# Patient Record
Sex: FEMALE | Race: WHITE | Employment: FULL TIME | URBAN - METROPOLITAN AREA
[De-identification: names, ages, dates, MRNs, and addresses within clinical notes are randomized per-mention and may not be internally consistent; named-entity substitution may affect disease eponyms.]

---

## 2017-03-23 NOTE — PROGRESS NOTES
Jacob Pagan is a 23year old female [facility] Patient's last menstrual period was 03/07/2017. Patient presents with:  Gyn Exam: ANNUAL EXAM / MEDS REFILL -- only wishes for ocps refilled. No need for valtrex.   .    OBSTETRICS HISTORY:  OB History   Gravid Triphasic (ORTHO TRI-CYCLEN, 28,) 0.18/0.215/0.25 MG-35 MCG Oral Tab, Take 1 tablet by mouth daily. , Disp: 3 Package, Rfl: 3  •  Albuterol Sulfate (VENTOLIN) (2.5 MG/3ML) 0.083% Inhalation Nebu Soln, Take  by nebulization every 6 (six) hours as needed for retraction or skin changes  Abdomen:  soft, nontender, nondistended, no masses  Skin/Hair: no unusual rashes or bruises  Extremities: no edema, no cyanosis  Psychiatric:  Oriented to time, place, person and situation.  Appropriate mood and affect    Pelvic

## 2017-04-06 NOTE — ED PROVIDER NOTES
Patient Seen in: [facility] AND [facility] Emergency Department    History   Patient presents with:  Abdomen/Flank Pain (GI/)      HPI    The patient presents complaining of right lower quadrant abdominal pain that started last night but is worsened today.   Sh daily        Review of Systems   Constitutional: Negative for fever and chills. HENT: Negative for congestion and sore throat. Eyes: Negative for pain and visual disturbance. Respiratory: Negative for cough and shortness of breath.     Cardiovascular Urine Moderate (*)     Leukocyte Esterase Urine Trace (*)     Bacteria Urine Few (*)     All other components within normal limits   BASIC METABOLIC PANEL (8) - Abnormal; Notable for the following:     Glucose 103 (*)     BUN 7 (*)     All other components diagnosis)    Disposition:  Discharge    Follow-up:  Karthik King MD  [address] [place] [id number] [phone]    Schedule an appointment as soon as possible for a visit in 2 days        Medications Prescribed:  Discharge Medicatio

## 2017-04-07 NOTE — TELEPHONE ENCOUNTER
PT WAS IN THE ER ON 4/6 WITH STRONG PAIN IN HER PELVIS, THEY DID A CT AND RULED OUT EVERYTHING .  SR TOLD PT SHE SHOULD CALL HER GYNE DR TO GET A ORDER FOR A ULTRA SOUND DUE TO OVARIAN  CYST , PT IS STILL HAVING PAIN , WOULD LIKE TO SPEAK TO NURSE ,

## 2017-04-07 NOTE — TELEPHONE ENCOUNTER
Pt stated she was in the ER yesterday for RLQ abdominal pain. Pt stated she had CT scan to r/o appendicitis and \"everything was normal\".  Pt stated that the ER doc told her to f/u with her doctor and recs to possibly have a pelvic US done to see if there

## 2017-04-07 NOTE — TELEPHONE ENCOUNTER
Since feels better, would not do anything. Call if pain recurs / worsens, then will order pelvic u/s.   CT scan states no pelvic masses thus nothing abn on ovaries

## 2017-07-31 NOTE — TELEPHONE ENCOUNTER
Pt thinks she has a UTI which she noticed symptoms more this morning, and she notices this after her period every few months.  req a c/b lv vm if no answer would like a RX # [phone]

## 2017-07-31 NOTE — TELEPHONE ENCOUNTER
C/O URINARY URGENCY, FREQUENCY AND MALODOROUS URINE. SAID SHE GETS UTI'S ONCE OR TWICE A YEAR. PT WILL COME FOR UA THIS AFTERNOON/EVENING AND ADVISED TO CALL IN THE MORNING FOR THE RESULT. ORDER PLACED.

## 2017-09-27 NOTE — TELEPHONE ENCOUNTER
The pt is returning a nurse's call, and states that the medication is for maintenance. Please advise.

## 2017-09-27 NOTE — TELEPHONE ENCOUNTER
Current Outpatient Prescriptions:                                      ValACYclovir HCl (VALTREX) 500 MG Oral Tab Take 1 tablet by mouth daily.  Disp: 30 tablet Rfl: 3

## 2017-09-27 NOTE — TELEPHONE ENCOUNTER
Pt states she has a canker sore. Pt likes to have Valtrex in case she needs to use it. Pt's Annual was 3/23/17 with ARLYN. Pt states she had informed NJG at her Annual she did not need Valtrex, but pt stating NJG said if she wants it to call.   Pt's pharmac

## 2017-09-28 NOTE — TELEPHONE ENCOUNTER
Canter sore dosing is very different from genital herpes outbreak which would be a daily dosing regimen.  If wishes for cold sore Rx, then needs 1 gram tabs -- take 2 tabs & repeat in 12 hours

## 2017-10-12 NOTE — TELEPHONE ENCOUNTER
SPOKE WITH PT AND SHE SAID HER SYMPTOMS DID RESOLVE BUT WANTS THE RX ANYWAY SO SHE HAS IT ON HAND WHEN SHE HAS ANOTHER OUTBREAK. RX SENT TO HER PHARMACY.

## 2018-03-04 NOTE — ED INITIAL ASSESSMENT (HPI)
PATIENT ARRIVED AMBULATORY TO ROOM C/O LOWER BACK PAIN STARTED 2 DAYS AGO. PATIENT DENIES ACUTE INJURY. DENIES NUMBNESS/TINGLING TO LEGS. NO INCONTINENCE. PT DENIES URINARY SYMPTOMS.  PATIENT STATES \"I WANT TO GET A URINE TEST BECAUSE I GET UTI'S A LOT\"

## 2018-03-04 NOTE — ED PROVIDER NOTES
Patient Seen in: [facility] AND [facility] Emergency Department    History   Patient presents with:  Back Pain (musculoskeletal)    Stated Complaint: back pain r/o kidney stone from IC    HPI    Effie Garrison is a 33year old female who presents with chief compla cetirizine (ZYRTEC) 10 MG Oral Tab,  Take 10 mg by mouth daily. Fluticasone Propionate (FLONASE) 50 MCG/ACT Nasal Suspension,  1 spray by Each Nare route daily. ValACYclovir HCl (VALTREX) 500 MG Oral Tab,  Take 1 tablet by mouth daily.        Family His There is no stridor. Air entry is equal.  Cardiovascular: Regular rate and rhythm, no murmurs, gallops, rubs. Capillary refill is brisk. No extremity edema. Gastrointestinal: Abdomen soft, nontender, nondistended.   There is no rebound tenderness or gua DIFFERENTIAL       MDM     Radiology findings: Ct Abdomen+pelvis Kidneystone 2d Rndr(no Iv,no Oral)(cpt=74176)    Result Date: 3/4/2018  CONCLUSION:  1. 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 Stable appearance since prior study.  2. Negative gallbladder, spleen

## 2018-03-04 NOTE — ED INITIAL ASSESSMENT (HPI)
Pt c/o lower back x1 week, states started on left side. Seen at [address] [place] [address] and sent for r/o kidney stone.

## 2018-03-04 NOTE — ED PROVIDER NOTES
Patient Seen in: [facility]    History   Patient presents with:  Back Pain (musculoskeletal)    Stated Complaint: lower back pain    HPI  Patient reports 3 days of severe back pain and unable to get comfortable.   Patient well-developed and well-nourished. No distress. Patient is sobbing and tremulous, moves slowly but stably on and off cart   HENT:   Head: Normocephalic and atraumatic.    Right Ear: External ear normal.   Left Ear: External ear normal.   Eyes: Conjunctiva

## 2018-03-12 RX ORDER — NORGESTIMATE-ETHINYL ESTRADIOL 7DAYSX3 28
1 TABLET ORAL DAILY
Qty: 84 TABLET | Refills: 0 | OUTPATIENT
Start: 2018-03-12 | End: 2018-04-09

## 2018-03-12 NOTE — TELEPHONE ENCOUNTER
Pt informed of KCBs recs and verbalized understanding. Pt asking what Lucho Maya would recommend for her kidney pain.   Pt stated she was given pain meds and muscle relaxant meds in the ER last weekend when she went in for kidney pain, but stopped taking these las

## 2018-03-12 NOTE — TELEPHONE ENCOUNTER
Pt states she went to ED on 3/4/18 and was informed that she has an UTI. Pt took Cephalexin 500mg, three times a day, for 7 days. Pt finished her medication yesterday. Pt states that she was informed it was not kidney stones.   (Pt has an ED f/u on 3/13/

## 2018-03-12 NOTE — TELEPHONE ENCOUNTER
----- Message from Caleb Charlton MD sent at 3/12/2018  4:21 PM CDT -----  UA overall negative. Will not treat at this time.

## 2018-03-12 NOTE — TELEPHONE ENCOUNTER
Discussed with KCB and pt should do an UA with reflex to culture and keep her post ED appt with ARLYN, on 3/13/18. Pt will go for UA in near future and call later for the results.

## 2018-03-13 NOTE — PROGRESS NOTES
Jessee Delgado is a 33year old female [facility] Patient's last menstrual period was 03/08/2018. Patient presents with:  Gyn Problem: ER F/UP -- Feb 22nd sudden onset back L flank pain -- TENS helped.  Then worsened weekend of March 4th -- dx w/ bad UTI -- no •  ValACYclovir HCl (VALTREX) 500 MG Oral Tab, Take 1 tablet by mouth daily. , Disp: 30 tablet, Rfl: 3  •  Ciclopirox 1 % External Shampoo, ADOLFO TO SCALP AND FACE QOD, Disp: , Rfl: 0    ALLERGIES:    Sulfanilamide           Unknown      Review of Systems:

## 2018-06-19 ENCOUNTER — TELEPHONE (OUTPATIENT)
Dept: OBGYN CLINIC | Facility: CLINIC | Age: 28
End: 2018-06-19

## 2018-06-19 DIAGNOSIS — R35.0 FREQUENCY OF URINATION: Primary | ICD-10-CM

## 2018-06-20 NOTE — TELEPHONE ENCOUNTER
Pt complains of frequency, a \"little bit of stinging\" with urination and lower back pain for a couple days. Denies abdominal pain. Pt reports she is concerned this is the start of another kidney infection because she had one recently. Advised pt to push hydration. Informed pt she can go to the lab for a UA and to call us after for results.

## 2018-07-12 NOTE — TELEPHONE ENCOUNTER
Pt's last annual was in 3/2017. Pt's last pap was normal in 2016. No future appt's made for annual.  Refill for nortrel 1/35 declined. Pt needs appt.

## 2018-09-18 NOTE — PROGRESS NOTES
Vitor Maynard is a 33year old female [facility] Patient's last menstrual period was 09/13/2018. Patient presents with:  Gyn Exam: annual  Contraception: gets monthly hand pain prior to period -- occurs in mother also. Liked OTcN best but name brand only.  Wi Sexual activity: Yes        Partners: Male        Birth control/protection: OCP    Other Topics      Concerns:         Service: Not Asked        Blood Transfusions: Not Asked        Caffeine Concern: Yes          coffee, soda, 1 cup daily        Oc denies depression or anxiety. Endocrine:     denies excessive thirst or urination. Heme/Lymph:    denies history of anemia, easy bruising or bleeding.       PHYSICAL EXAM:   /71   Pulse 72   Wt 160 lb (72.6 kg)   LMP 09/13/2018   BMI 28.34 kg/m²

## 2018-09-19 NOTE — TELEPHONE ENCOUNTER
Received notification from pt's pharmacy stating PA is required for Ortho Tri Cyclen. Called [employer] at [phone] to initiate PA. Spoke to Diana who states PA is not required. Notified pt's pharmacy.  Pharmacist states insurance does not cover

## 2019-05-14 ENCOUNTER — TELEPHONE (OUTPATIENT)
Dept: OBGYN CLINIC | Facility: CLINIC | Age: 29
End: 2019-05-14

## 2019-05-14 DIAGNOSIS — R35.0 URINE FREQUENCY: Primary | ICD-10-CM

## 2019-05-14 NOTE — TELEPHONE ENCOUNTER
Message to [facility]. Please review UA. Culture still in process. Pt called with urgency, frequency, burning with urination and urine odor.

## 2019-05-14 NOTE — TELEPHONE ENCOUNTER
Pt thinks that she has an uti. Pt states that she has the symptoms of urgency, frequency, burning with urination and  urine odor. Denies back pain, fever, cloudy urine and having her period. Informed pt to go for a UA with reflex and call later day for the results. Pt stated understanding.

## 2019-10-24 NOTE — PROGRESS NOTES
Kym Le is a 30year old female [facility] Patient's last menstrual period was 10/09/2019. Patient presents with:  Gyn Exam: ANNUAL EXAM / BCP REFILL  Last annual exam was 2017. Last pap was 2016 and normal. Denies hx of abnormal pap's.  Will do pap tod Not on file        Minutes per session: Not on file      Stress: Not on file    Relationships      Social connections:        Talks on phone: Not on file        Gets together: Not on file        Attends Yazidi service: Not on file        Active member o fatigue, night sweats, hot flashes  Eyes:  denies blurred or double vision  Cardiovascular:  denies chest pain or palpitations  Respiratory:  denies shortness of breath  Gastrointestinal:  denies heartburn, abdominal pain, diarrhea or constipation  Genitou orders for this visit:    Well woman exam with routine gynecological exam    Medication refill  -     Norgestim-Eth Estrad Triphasic (TRI-SPRINTEC) 0.18/0.215/0.25 MG-35 MCG Oral Tab; Take 1 tablet by mouth once daily.     Cervical cancer screening  -     T

## 2019-10-30 NOTE — TELEPHONE ENCOUNTER
PT NOTIFIED OF RESULTS AND RECS. SHE IS AWARE COLPO IS DONE WITH A DR AND SHE HAS SEEN NJG SO COLPO BOOKED ON 11-13-19 WITH ARLYN.

## 2019-11-14 NOTE — PROCEDURES
Colpo w/Cx Biopsy and ECC      Birth control method(s) used: ocp    Consent signed. Procedure discussed with patient in detail including indication, risk, benefits, alternatives and complications.     Indication: ASCUS HPV (+)    Procedure:  Under satisf

## 2019-11-14 NOTE — PROGRESS NOTES
My Chart message sent to patient re: colpo ALBERTO 1. Please follow -up & make sure pt saw their letter within next week. Enter patient info into Pap log.

## 2021-01-04 NOTE — TELEPHONE ENCOUNTER
Patient has made appt on Wednesday (1/6) with Navarro Mcdaniel for Physical.    She is asking to get refill ASAP. She took her last pill yesterday. Please advise.

## 2021-01-07 NOTE — PROGRESS NOTES
Vitor Maynard is a 35year old female [facility] Patient's last menstrual period was 01/29/2020 (approximate). Patient presents with:  Gyn Exam: ANNUAL EXAM   Last annual exam and pap was last year. Pap was ASCUS, + HPV. Colpo showed ALBERTO 1.  Will do co-testin per session: Not on file      Stress: Not on file    Relationships      Social connections        Talks on phone: Not on file        Gets together: Not on file        Attends Christian service: Not on file        Active member of club or organization: Not flashes  Eyes:  denies blurred or double vision  Cardiovascular:  denies chest pain or palpitations  Respiratory:  denies shortness of breath  Gastrointestinal:  denies heartburn, abdominal pain, diarrhea or constipation  Genitourinary:  denies dysuria, in woman exam with routine gynecological exam    Medication refill  -     Norgestim-Eth Estrad Triphasic (TRI-SPRINTEC) 0.18/0.215/0.25 MG-35 MCG Oral Tab; Take 1 tablet by mouth once daily.     Screening for malignant neoplasm of cervix  -     THINPREP PAP SM

## 2021-01-13 ENCOUNTER — TELEPHONE (OUTPATIENT)
Dept: OBGYN CLINIC | Facility: CLINIC | Age: 31
End: 2021-01-13

## 2021-01-13 NOTE — TELEPHONE ENCOUNTER
Pt informed of MAFs recs below and verbalized understanding. Pt stated that she was very uncomfortable with this procedure last time and doesn't know if she could go through with this again. Pt asking if there is an anxiety medication that she could take before colpo? Message to [facility] to please advise. Pt stated she is heading out of town today and may not answer when we call her back.

## 2021-01-13 NOTE — TELEPHONE ENCOUNTER
----- Message from DARIO Savage sent at 1/11/2021  5:53 AM CST -----  Please let pt know pap shows same as last year (ASCUS, + HPV) so she needs another colpo. Please let me know if she has any questions.      MAF

## 2021-01-13 NOTE — TELEPHONE ENCOUNTER
Minh send her a dose of xanax but she can not drive herself to and from the appt, as we have no idea how it will affect her. This med can sometimes make people feel sleep/groggy. If she agrees to this, Minh send the dose to her pharmacy and she should take it about 30 min before her appt.      MAF

## 2021-01-22 ENCOUNTER — TELEPHONE (OUTPATIENT)
Dept: OBGYN CLINIC | Facility: CLINIC | Age: 31
End: 2021-01-22

## 2021-01-22 NOTE — TELEPHONE ENCOUNTER
Patient calling stating she had an emergency and had to leave abruptly. Forgot BC and is to begin again Sunday.  Patient would like if one month supply can be sent to pharmacy in [facility] [address] [facility]  [phone]

## 2021-01-22 NOTE — TELEPHONE ENCOUNTER
Called pharmacy, [place], at [phone]. Placed with pharmacist Daisy Park, for one pack for Tri-Sprintec with no refills. Sig:  Take one tablet po daily. Sent to Ryan Falcon MD on call, that one pack sent to the pharmacy.

## 2022-05-10 NOTE — PROGRESS NOTES
Pt 7w3d by LMP seen for OBN appt today. She reports regular, monthly cycles. She complains of nausea without vomiting. Reviewed small frequent meals high in protein, B6 25 mg up to 4 x daily, unisom 1/2 tab at bedtime. Normal PN labs ordered, plus 1 hr gtt d/t BMI, HCV, Varicella antibodies. Pt advised all labs must be completed and resulted prior to MD appt. Pt scheduled with MD 5/23/22 with ARPITA. Patient has anxiety related to blood draws. Partner's name is Thad Dotson contact #[phone]; race:   Occupation: [employer]    MEDICAL HISTORY    Anemia No    Anesthetic complications No    Anxiety/Depression  Yes Self reported anxiety, desired [facility] Navigator referral.   Autoimmune Disorder No    Asthma  Yes Seasonal asthma, triggered by allergies   Cancer No    Diabetes  No    Gyne/breast Surgery No    Heart Disease No    Hepatitis/Liver Disease  No    History of blood transfusion No    History of abnormal pap Yes ASCUS +HPV 2019, 2021. Colpo in 2019. Hypertension  No    Infertility  No    Kidney Disease/Frequent UTIs  Yes Kidney infection 2017   Medication Allergies Yes Topical Sulfa- face rash   Latex Allergies No    Food Allergies  No    Neurological Disorder/Epilepsy No    Operations/Hospitalizations Yes Tonsillectomy 2010   TB exposure  No    Thyroid Dysfunction No    Trauma/Violence  No    Uterine Anomaly  No    Uterine Fibroids  No    Variocosities/DVTs No    Smoker No    Drug usage in prior year No    Alcohol No    Would you accept a blood transfusion? Yes              INFECTION HISTORY  Chlamydia No    Pt or partner have hx of Genital Herpes Yes Last outbreak was 5 years ag0. Does not take anything.    Gonorrhea No    Hepatitis B No    HIV No    HPV No    MRSA Yes On face 2015   Syphilis No    Tattoos No    Live with someone or Exposed to TB No    Rash or viral illness since LMP  No    Varicella No Varicella igg ordered   Recent Travel (or planned travel) to [facility] [place] for self and or partner Yes Travelled to [place] in last 6 months   Pets Yes 2 dogs       [address]  Screening/Teratology Counseling- Includes patient, baby's father, or anyone in either family with:  Patient's age 28 years or older as of estimated date of delivery: No   Thalassemia ([facility] [place], [address], or  background): MCV less than 80: No   Neural tube defect (Meningomyelocele, Spina bifida, or Anencephaly): No   Congenital heart defect: No   Down syndrome: No   Calderon-Sachs (Ashkenazi Gnosticist, [place], [place]): No   Canavan disease (Ashkenazi Gnosticist): No   Familial dysautonomia (Ashkenazi Gnosticist): No   Sickle cell disease or trait (): No   Hemophilia or other blood disorders: No   Muscular dystrophy: No    Cystic fibrosis: No   Boulder's chorea: No   Intellectual disability and/or autism: Yes (Comment: paternal neice on austism spectrum)   If yes, was the person tested for Fragile X?: No   Other inherited genetic or chromosomal disorder: Yes (Comment: Pt brother with seizure disorder passed to his daughter- infantile seizures)   Maternal metabolic disorder (eg. Type 1 diabetes, PKU): No   Patient or baby's father had child with birth defects not listed above: No   Recurrent pregnancy loss, or a stillbirth: No   Medications (including supplements, vitamins, herbs, or OTC drugs)/illicit/recreational drugs/alcohol since last menstrual period: Yes   If yes, agent(s) and strength/dosage: See med list           MISC    Infant vaccinations  Yes     Pt. Has answered NO 5P questions and has NO  risk factors. Pt. Given What pregnant women need to know handout.

## 2022-05-23 NOTE — PROGRESS NOTES
Daily nausea without emesis. Early US = dates. 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 Racheal Chiu at visit-  at [place] on [address]. We discussed FTS and they'll message within week if desired.

## 2022-06-20 PROBLEM — O99.210 OBESITY IN PREGNANCY: Status: ACTIVE | Noted: 2022-06-20

## 2022-06-20 PROBLEM — D21.9 FIBROID: Status: ACTIVE | Noted: 2022-06-20

## 2022-06-20 PROBLEM — O99.210 OBESITY IN PREGNANCY (HCC): Status: ACTIVE | Noted: 2022-06-20

## 2022-06-20 NOTE — PROGRESS NOTES
She reports nauseated daily at noon til night time. Will try pepcid - feels like something gets stuck in throat and acid taste in mouth.   Had first tri screen - US normal. Right lateral pedunculated fibroid seen  Will send referral for level 2  rto 4 weeks    DARIO Greco

## 2022-06-21 NOTE — TELEPHONE ENCOUNTER
[facility] screening results obt  Reviewed by   Negative results for Trisomy 18/13/21  Fetal fraction 11%  Fetal sex: female    Pt  Reminded on Use of [facility] registration card   Given at  Previous appointment   Pt can log in  and view results    Pt states understanding  Copy of results sent for scanning into pt record

## 2022-08-08 NOTE — PROGRESS NOTES
MARISA Walters is a 31 year old       who presents for a post partum visit.  She is s/p vaginal delivery.    Postpartum  Review: (entered by: Eryn Graves,Medical Assistant)  Period since delivery? YES  Edie since delivery? YES  She currently is bottle feeding exclusively.    Are there concerns about your baby that you want us to know about? No  Okay to leave a message with results?  Yes  Feeding : bottle  Are you planning on getting pregnant again? No  Per the patient,  no laceration or episiotomy.   Any difficulty with bowel movements? NO  Any difficulty with urination (frequency, pressure, burning)?  NO  Other physical complaints:  None   Gestational diabetes last pregnancy?: No  Depression screening:  Over the last 2 weeks, how often have you been bothered by the following problems?          PHQ2 Score: 0  PHQ2 Score Interpretation: No further screening needed  1. Little interest or pleasure in activity?: 0  2. Feeling down, depressed, or hopeless?: 0       Generalized Anxiety Disorder (GAD7)    Over the last 2 weeks, how often have you been bothered by the following problems?   Score: 0    Score:  0-4 minimal symptoms 10-14 moderate symptoms   5-9 mild symptoms 15-21 severe symptoms      1.  Feeling nervous, anxious or on edge Not at all   2.  Not being able to stop or control worrying Not at all   3.  Worrying too much about different things Not at all   4.  Trouble relaxing Not at all   5.  Being so restless that it's hard to sit still Not at all   6.  Becoming easily annoyed or irritable Not at all   7.  Feeling afraid as if something awful might happen Not at all            If you checked off any problems, how difficult have these made it for you to do your work, take care of things at home, or get along with other people? Not difficult at all              OBJECTIVE    Physical Exam:  Visit Vitals  LMP 2020     General: Well developed, well nourished, in no acute  Pt for level 2 US  HX neg NIPT  Denies pregnancy complaints  States active fetus distress.  Head: Normocephalic and atraumatic.   Abdomen: Abdomen soft and nontender without masses.  Psych: Alert and cooperative; normal mood and affect; normal attention span and concentration.     Impression/Plan:      Normal postpartum exam after vaginal delivery     I have discussed postpartum contraception with the patient including progestin-only oral contraceptive. She elects: progestin-only oral contraceptive.  We discussed further childbearing plans. Discussed returning to all normal activity includiing intercourse and exercise.

## 2022-09-12 ENCOUNTER — ROUTINE PRENATAL (OUTPATIENT)
Dept: OBGYN CLINIC | Facility: CLINIC | Age: 32
End: 2022-09-12
Payer: COMMERCIAL

## 2022-09-12 ENCOUNTER — LAB ENCOUNTER (OUTPATIENT)
Dept: LAB | Facility: HOSPITAL | Age: 32
End: 2022-09-12
Attending: OBSTETRICS & GYNECOLOGY
Payer: COMMERCIAL

## 2022-09-12 VITALS
WEIGHT: 194 LBS | HEART RATE: 90 BPM | BODY MASS INDEX: 34 KG/M2 | SYSTOLIC BLOOD PRESSURE: 108 MMHG | DIASTOLIC BLOOD PRESSURE: 72 MMHG

## 2022-09-12 DIAGNOSIS — Z34.02 ENCOUNTER FOR SUPERVISION OF NORMAL FIRST PREGNANCY IN SECOND TRIMESTER: ICD-10-CM

## 2022-09-12 DIAGNOSIS — Z34.02 ENCOUNTER FOR SUPERVISION OF NORMAL FIRST PREGNANCY IN SECOND TRIMESTER: Primary | ICD-10-CM

## 2022-09-12 LAB
APPEARANCE: CLEAR
BILIRUBIN: NEGATIVE
DEPRECATED RDW RBC AUTO: 46.4 FL (ref 35.1–46.3)
ERYTHROCYTE [DISTWIDTH] IN BLOOD BY AUTOMATED COUNT: 13.3 % (ref 11–15)
GLUCOSE (URINE DIPSTICK): NEGATIVE MG/DL
GLUCOSE 1H P GLC SERPL-MCNC: 145 MG/DL
HCT VFR BLD AUTO: 37.5 %
HGB BLD-MCNC: 12.3 G/DL
KETONES (URINE DIPSTICK): NEGATIVE MG/DL
LEUKOCYTES: NEGATIVE
MCH RBC QN AUTO: 31.1 PG (ref 26–34)
MCHC RBC AUTO-ENTMCNC: 32.8 G/DL (ref 31–37)
MCV RBC AUTO: 94.7 FL
MULTISTIX LOT#: NORMAL NUMERIC
NITRITE, URINE: NEGATIVE
OCCULT BLOOD: NEGATIVE
PH, URINE: 5 (ref 4.5–8)
PLATELET # BLD AUTO: 174 10(3)UL (ref 150–450)
PROTEIN (URINE DIPSTICK): NEGATIVE MG/DL
RBC # BLD AUTO: 3.96 X10(6)UL
SPECIFIC GRAVITY: 1 (ref 1–1.03)
URINE-COLOR: YELLOW
UROBILINOGEN,SEMI-QN: 0.2 MG/DL (ref 0–1.9)
WBC # BLD AUTO: 11.6 X10(3) UL (ref 4–11)

## 2022-09-12 PROCEDURE — 85027 COMPLETE CBC AUTOMATED: CPT

## 2022-09-12 PROCEDURE — 82950 GLUCOSE TEST: CPT

## 2022-09-12 PROCEDURE — 36415 COLL VENOUS BLD VENIPUNCTURE: CPT

## 2022-09-13 ENCOUNTER — PATIENT MESSAGE (OUTPATIENT)
Dept: OBGYN CLINIC | Facility: CLINIC | Age: 32
End: 2022-09-13

## 2022-09-13 DIAGNOSIS — O99.810 PREGNANCY WITH ABNORMAL GLUCOSE TOLERANCE TEST (GTT): Primary | ICD-10-CM

## 2022-09-13 NOTE — TELEPHONE ENCOUNTER
Pt mycharting about 1 hr gtt of 145, pt informed of need for 3 hr gtt. Fasting and scheduling instructions given. Order placed.

## 2022-09-13 NOTE — TELEPHONE ENCOUNTER
From: Sandro Brochure  To: Tiffany Santos MD  Sent: 9/13/2022 9:43 AM CDT  Subject: GD Failed Test    Hi Dr Santos,    We met yesterday at my 25 week appointment. I went and took my gestational diabetes test after my appointment and got a result of 145. Jean Pierre Player been very anxious about that since seeing my results. Can someone give me a call or send me a message today about next steps?  I assume a 3hr test.     Thanks,  Keven Koch

## 2022-09-19 ENCOUNTER — LABORATORY ENCOUNTER (OUTPATIENT)
Dept: LAB | Facility: HOSPITAL | Age: 32
End: 2022-09-19
Attending: OBSTETRICS & GYNECOLOGY

## 2022-09-19 DIAGNOSIS — O99.810 PREGNANCY WITH ABNORMAL GLUCOSE TOLERANCE TEST (GTT): ICD-10-CM

## 2022-09-19 LAB
GLUCOSE 1H P GLC SERPL-MCNC: 130 MG/DL
GLUCOSE 2H P GLC SERPL-MCNC: 126 MG/DL
GLUCOSE 3H P GLC SERPL-MCNC: 96 MG/DL (ref 70–140)
GLUCOSE P FAST SERPL-MCNC: 78 MG/DL

## 2022-09-19 PROCEDURE — 82951 GLUCOSE TOLERANCE TEST (GTT): CPT

## 2022-09-19 PROCEDURE — 36415 COLL VENOUS BLD VENIPUNCTURE: CPT

## 2022-09-19 PROCEDURE — 82952 GTT-ADDED SAMPLES: CPT

## 2022-10-03 ENCOUNTER — ROUTINE PRENATAL (OUTPATIENT)
Dept: OBGYN CLINIC | Facility: CLINIC | Age: 32
End: 2022-10-03
Payer: COMMERCIAL

## 2022-10-03 VITALS
BODY MASS INDEX: 35 KG/M2 | SYSTOLIC BLOOD PRESSURE: 111 MMHG | WEIGHT: 200 LBS | HEART RATE: 92 BPM | DIASTOLIC BLOOD PRESSURE: 74 MMHG

## 2022-10-03 DIAGNOSIS — Z34.02 ENCOUNTER FOR SUPERVISION OF NORMAL FIRST PREGNANCY IN SECOND TRIMESTER: Primary | ICD-10-CM

## 2022-10-03 LAB
APPEARANCE: CLEAR
BILIRUBIN: NEGATIVE
GLUCOSE (URINE DIPSTICK): NEGATIVE MG/DL
KETONES (URINE DIPSTICK): NEGATIVE MG/DL
LEUKOCYTES: NEGATIVE
MULTISTIX LOT#: ABNORMAL NUMERIC
NITRITE, URINE: NEGATIVE
OCCULT BLOOD: NEGATIVE
PH, URINE: 5 (ref 4.5–8)
PROTEIN (URINE DIPSTICK): NEGATIVE MG/DL
SPECIFIC GRAVITY: 1 (ref 1–1.03)
URINE-COLOR: YELLOW
UROBILINOGEN,SEMI-QN: 0.2 MG/DL (ref 0–1.9)

## 2022-10-03 PROCEDURE — 81002 URINALYSIS NONAUTO W/O SCOPE: CPT | Performed by: OBSTETRICS & GYNECOLOGY

## 2022-10-03 PROCEDURE — 3074F SYST BP LT 130 MM HG: CPT | Performed by: OBSTETRICS & GYNECOLOGY

## 2022-10-03 PROCEDURE — 3078F DIAST BP <80 MM HG: CPT | Performed by: OBSTETRICS & GYNECOLOGY

## 2022-10-17 ENCOUNTER — ROUTINE PRENATAL (OUTPATIENT)
Dept: OBGYN CLINIC | Facility: CLINIC | Age: 32
End: 2022-10-17
Payer: COMMERCIAL

## 2022-10-17 VITALS
HEART RATE: 99 BPM | BODY MASS INDEX: 35 KG/M2 | SYSTOLIC BLOOD PRESSURE: 108 MMHG | DIASTOLIC BLOOD PRESSURE: 72 MMHG | WEIGHT: 199 LBS

## 2022-10-17 DIAGNOSIS — Z34.91 ENCOUNTER FOR SUPERVISION OF NORMAL PREGNANCY IN FIRST TRIMESTER, UNSPECIFIED GRAVIDITY: Primary | ICD-10-CM

## 2022-10-17 PROBLEM — F40.231 SEVERE NEEDLE PHOBIA: Status: ACTIVE | Noted: 2022-10-17

## 2022-10-17 LAB
APPEARANCE: CLEAR
BILIRUBIN: NEGATIVE
GLUCOSE (URINE DIPSTICK): NEGATIVE MG/DL
KETONES (URINE DIPSTICK): NEGATIVE MG/DL
LEUKOCYTES: NEGATIVE
MULTISTIX LOT#: NORMAL NUMERIC
NITRITE, URINE: NEGATIVE
OCCULT BLOOD: NEGATIVE
PH, URINE: 7 (ref 4.5–8)
PROTEIN (URINE DIPSTICK): NEGATIVE MG/DL
SPECIFIC GRAVITY: 1 (ref 1–1.03)
URINE-COLOR: YELLOW
UROBILINOGEN,SEMI-QN: 0.2 MG/DL (ref 0–1.9)

## 2022-10-17 PROCEDURE — 3074F SYST BP LT 130 MM HG: CPT | Performed by: OBSTETRICS & GYNECOLOGY

## 2022-10-17 PROCEDURE — 3078F DIAST BP <80 MM HG: CPT | Performed by: OBSTETRICS & GYNECOLOGY

## 2022-10-17 PROCEDURE — 81002 URINALYSIS NONAUTO W/O SCOPE: CPT | Performed by: OBSTETRICS & GYNECOLOGY

## 2022-10-17 NOTE — PROGRESS NOTES
No S/S of PTL. Discussed TDAP and she'll have to get spouse with her another time for a needle stick.

## 2022-10-27 ENCOUNTER — PATIENT MESSAGE (OUTPATIENT)
Dept: OBGYN CLINIC | Facility: CLINIC | Age: 32
End: 2022-10-27

## 2022-10-27 DIAGNOSIS — R09.81 CONGESTION OF NASAL SINUS: Primary | ICD-10-CM

## 2022-10-28 ENCOUNTER — LAB ENCOUNTER (OUTPATIENT)
Dept: LAB | Age: 32
End: 2022-10-28
Attending: OBSTETRICS & GYNECOLOGY
Payer: COMMERCIAL

## 2022-10-28 ENCOUNTER — TELEPHONE (OUTPATIENT)
Dept: OBGYN CLINIC | Facility: CLINIC | Age: 32
End: 2022-10-28

## 2022-10-28 DIAGNOSIS — R09.81 CONGESTION OF NASAL SINUS: ICD-10-CM

## 2022-10-28 NOTE — TELEPHONE ENCOUNTER
Can do video visit at that time-- she is only 31 wks. Needs to quarantine. We cannot see her in office physically for 10 days.

## 2022-10-28 NOTE — TELEPHONE ENCOUNTER
Pt did covid test, results not in until Mon.  Pt wants to know about Mon apt with WYATT (see [employer] message yesterday)

## 2022-10-28 NOTE — TELEPHONE ENCOUNTER
Video visit conversion same day at 940 is based on WYATT preference. I stated at that time meaning at 640 pm on my schedule. She does not have BP issues thus okay not to have done on Monday.

## 2022-10-28 NOTE — TELEPHONE ENCOUNTER
Message back to [facility]. Pt asking how she is supposed to get her BP checked if appt is video call.

## 2022-10-28 NOTE — TELEPHONE ENCOUNTER
31w6d. Pt mycharted yesterday that she was exposed to co-workers who were COVID positive. Pt states she took at home COVID test last night and had a faint positive result. Pt did complete a PCR and it is currently in process. Pt states runny/stuffy nose and fatigue that she indicates she has had entire pregnancy. Pt has appt with WYATT on Monday in the morning, discussed appt possibly being moved to end of day with another provider or being seen outside of 10 day quarantine date. Did discuss at home comfort care and OTC medications she can take that are safe with pregnancy. Pt states +FM, denies LOF, VB or ctxs. BMI does not meet anticoagulation therapy requirements. BMI: 35.4  MFM appt on 11/7 for growth ultrasound. [facility] does have office appt on 10/31 at 640 pm, on hold for this pt until we hear back from provider     To [address] on-call to please review if we should await PCR for confirmation or use faint positive home test? Should we schedule pt at the end of your office schedule on 10/31 at 640 pm? Thank you.

## 2022-10-29 ENCOUNTER — PATIENT MESSAGE (OUTPATIENT)
Dept: OBGYN CLINIC | Facility: CLINIC | Age: 32
End: 2022-10-29

## 2022-10-29 PROBLEM — U07.1 COVID-19 AFFECTING PREGNANCY IN THIRD TRIMESTER (HCC): Status: ACTIVE | Noted: 2022-10-29

## 2022-10-29 PROBLEM — O98.513 COVID-19 AFFECTING PREGNANCY IN THIRD TRIMESTER: Status: ACTIVE | Noted: 2022-10-29

## 2022-10-29 PROBLEM — O98.513 COVID-19 AFFECTING PREGNANCY IN THIRD TRIMESTER (HCC): Status: ACTIVE | Noted: 2022-10-29

## 2022-10-29 PROBLEM — U07.1 COVID-19 AFFECTING PREGNANCY IN THIRD TRIMESTER: Status: ACTIVE | Noted: 2022-10-29

## 2022-10-29 LAB — SARS-COV-2 RNA RESP QL NAA+PROBE: NOT DETECTED

## 2022-10-29 NOTE — TELEPHONE ENCOUNTER
Pt called and informed of [facility] recs that since BP is stable at previous visit that would move forward with VV with WYATT on 10/31. Discussed with pt that non-physical assessment will be done, such as discussing FM, if any LOF, ctxs or VB. Also can discuss any questions regarding COVID in pregnancy. Pt informed if there is any concerns where MD feels she needs further monitoring can be done on the [facility]. Pt states understanding, will keep VV appt with WYATT on Monday. Pt states +FM, denies any LOF, VB or ctxs. We reviewed kick counts, PTL instructions as well as pre-eclampsia precautions. COVID PCR still processing.

## 2022-10-31 ENCOUNTER — ROUTINE PRENATAL (OUTPATIENT)
Dept: OBGYN CLINIC | Facility: CLINIC | Age: 32
End: 2022-10-31
Payer: COMMERCIAL

## 2022-10-31 ENCOUNTER — PATIENT MESSAGE (OUTPATIENT)
Dept: OBGYN CLINIC | Facility: CLINIC | Age: 32
End: 2022-10-31

## 2022-10-31 VITALS
BODY MASS INDEX: 35 KG/M2 | HEART RATE: 91 BPM | SYSTOLIC BLOOD PRESSURE: 103 MMHG | WEIGHT: 200 LBS | DIASTOLIC BLOOD PRESSURE: 71 MMHG

## 2022-10-31 DIAGNOSIS — Z34.03 ENCOUNTER FOR SUPERVISION OF NORMAL FIRST PREGNANCY IN THIRD TRIMESTER: Primary | ICD-10-CM

## 2022-10-31 LAB
APPEARANCE: CLEAR
GLUCOSE (URINE DIPSTICK): NEGATIVE MG/DL
KETONES (URINE DIPSTICK): 15 MG/DL
MULTISTIX LOT#: ABNORMAL NUMERIC
NITRITE, URINE: NEGATIVE
PROTEIN (URINE DIPSTICK): NEGATIVE MG/DL
URINE-COLOR: YELLOW

## 2022-10-31 PROCEDURE — 3078F DIAST BP <80 MM HG: CPT | Performed by: OBSTETRICS & GYNECOLOGY

## 2022-10-31 PROCEDURE — 81002 URINALYSIS NONAUTO W/O SCOPE: CPT | Performed by: OBSTETRICS & GYNECOLOGY

## 2022-10-31 PROCEDURE — 3074F SYST BP LT 130 MM HG: CPT | Performed by: OBSTETRICS & GYNECOLOGY

## 2022-10-31 NOTE — PROGRESS NOTES
COVID PCR negative but had home test (+). (+) exposure at work. Has MFM appt next week.  To get TDAP in 2 wks

## 2022-11-04 ENCOUNTER — HOSPITAL ENCOUNTER (OUTPATIENT)
Facility: HOSPITAL | Age: 32
Discharge: HOME | End: 2022-11-04
Attending: OBSTETRICS & GYNECOLOGY | Admitting: OBSTETRICS & GYNECOLOGY
Payer: COMMERCIAL

## 2022-11-04 ENCOUNTER — APPOINTMENT (OUTPATIENT)
Dept: ULTRASOUND IMAGING | Facility: HOSPITAL | Age: 32
End: 2022-11-04
Attending: OBSTETRICS & GYNECOLOGY
Payer: COMMERCIAL

## 2022-11-04 ENCOUNTER — TELEPHONE (OUTPATIENT)
Dept: OBGYN CLINIC | Facility: CLINIC | Age: 32
End: 2022-11-04

## 2022-11-04 VITALS — SYSTOLIC BLOOD PRESSURE: 130 MMHG | RESPIRATION RATE: 18 BRPM | DIASTOLIC BLOOD PRESSURE: 56 MMHG | HEART RATE: 80 BPM

## 2022-11-04 PROCEDURE — 76815 OB US LIMITED FETUS(S): CPT | Performed by: OBSTETRICS & GYNECOLOGY

## 2022-11-04 PROCEDURE — 59025 FETAL NON-STRESS TEST: CPT | Performed by: OBSTETRICS & GYNECOLOGY

## 2022-11-04 RX ORDER — ACETAMINOPHEN 500 MG
TABLET ORAL
Status: DISCONTINUED
Start: 2022-11-04 | End: 2022-11-05

## 2022-11-04 RX ORDER — ACETAMINOPHEN 500 MG
1000 TABLET ORAL ONCE
Status: COMPLETED | OUTPATIENT
Start: 2022-11-04 | End: 2022-11-04

## 2022-11-04 NOTE — TELEPHONE ENCOUNTER
ОЛЬГА from [address] [place] [address] for [facility]. Pt should go in now. Patient notified of recs and directions given to [facility]. Patient is appreciative.
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
## 2022-11-07 ENCOUNTER — HOSPITAL ENCOUNTER (OUTPATIENT)
Dept: PERINATAL CARE | Facility: HOSPITAL | Age: 32
Discharge: HOME | End: 2022-11-07
Attending: OBSTETRICS & GYNECOLOGY
Payer: COMMERCIAL

## 2022-11-07 ENCOUNTER — HOSPITAL ENCOUNTER (OUTPATIENT)
Dept: PERINATAL CARE | Facility: HOSPITAL | Age: 32
End: 2022-11-07
Attending: OBSTETRICS & GYNECOLOGY
Payer: COMMERCIAL

## 2022-11-07 VITALS
WEIGHT: 200 LBS | DIASTOLIC BLOOD PRESSURE: 66 MMHG | BODY MASS INDEX: 35 KG/M2 | HEART RATE: 113 BPM | SYSTOLIC BLOOD PRESSURE: 116 MMHG

## 2022-11-07 DIAGNOSIS — O98.513 COVID-19 AFFECTING PREGNANCY IN THIRD TRIMESTER: ICD-10-CM

## 2022-11-07 DIAGNOSIS — O99.210 OBESITY IN PREGNANCY: ICD-10-CM

## 2022-11-07 DIAGNOSIS — U07.1 COVID-19 AFFECTING PREGNANCY IN THIRD TRIMESTER: ICD-10-CM

## 2022-11-07 DIAGNOSIS — O99.213 OBESITY AFFECTING PREGNANCY IN THIRD TRIMESTER: ICD-10-CM

## 2022-11-07 DIAGNOSIS — A60.00 GENITAL HERPES SIMPLEX, UNSPECIFIED SITE: ICD-10-CM

## 2022-11-07 DIAGNOSIS — O99.210 OBESITY IN PREGNANCY: Primary | ICD-10-CM

## 2022-11-07 DIAGNOSIS — D21.9 FIBROID: ICD-10-CM

## 2022-11-07 PROCEDURE — 76819 FETAL BIOPHYS PROFIL W/O NST: CPT

## 2022-11-07 PROCEDURE — 76816 OB US FOLLOW-UP PER FETUS: CPT | Performed by: OBSTETRICS & GYNECOLOGY

## 2022-11-15 ENCOUNTER — ROUTINE PRENATAL (OUTPATIENT)
Dept: OBGYN CLINIC | Facility: CLINIC | Age: 32
End: 2022-11-15
Payer: COMMERCIAL

## 2022-11-15 VITALS
DIASTOLIC BLOOD PRESSURE: 71 MMHG | WEIGHT: 204 LBS | BODY MASS INDEX: 36 KG/M2 | SYSTOLIC BLOOD PRESSURE: 105 MMHG | HEART RATE: 101 BPM

## 2022-11-15 DIAGNOSIS — Z34.03 ENCOUNTER FOR SUPERVISION OF NORMAL FIRST PREGNANCY IN THIRD TRIMESTER: Primary | ICD-10-CM

## 2022-11-15 DIAGNOSIS — O99.210 OBESITY IN PREGNANCY: ICD-10-CM

## 2022-11-15 LAB
BILIRUBIN: NEGATIVE
GLUCOSE (URINE DIPSTICK): NEGATIVE MG/DL
KETONES (URINE DIPSTICK): NEGATIVE MG/DL
MULTISTIX EXPIRATION DATE: ABNORMAL DATE
MULTISTIX LOT#: 4023 NUMERIC
NITRITE, URINE: NEGATIVE
OCCULT BLOOD: NEGATIVE
PH, URINE: 6.5 (ref 4.5–8)
PROTEIN (URINE DIPSTICK): NEGATIVE MG/DL
SPECIFIC GRAVITY: 1.01 (ref 1–1.03)
UROBILINOGEN,SEMI-QN: 0.2 MG/DL (ref 0–1.9)

## 2022-11-15 PROCEDURE — 90715 TDAP VACCINE 7 YRS/> IM: CPT | Performed by: OBSTETRICS & GYNECOLOGY

## 2022-11-15 PROCEDURE — 3074F SYST BP LT 130 MM HG: CPT | Performed by: OBSTETRICS & GYNECOLOGY

## 2022-11-15 PROCEDURE — 3078F DIAST BP <80 MM HG: CPT | Performed by: OBSTETRICS & GYNECOLOGY

## 2022-11-15 PROCEDURE — 90471 IMMUNIZATION ADMIN: CPT | Performed by: OBSTETRICS & GYNECOLOGY

## 2022-11-15 PROCEDURE — 81002 URINALYSIS NONAUTO W/O SCOPE: CPT | Performed by: OBSTETRICS & GYNECOLOGY

## 2022-11-15 NOTE — PROGRESS NOTES
Doing well. Tdap today. Declines flu vaccine. Informed patient that she will need weekly NSTs starting at 36 wks.  RTC 2 wks

## 2022-11-28 ENCOUNTER — LAB ENCOUNTER (OUTPATIENT)
Dept: LAB | Facility: HOSPITAL | Age: 32
End: 2022-11-28
Attending: OBSTETRICS & GYNECOLOGY
Payer: COMMERCIAL

## 2022-11-28 ENCOUNTER — ROUTINE PRENATAL (OUTPATIENT)
Dept: OBGYN CLINIC | Facility: CLINIC | Age: 32
End: 2022-11-28
Payer: COMMERCIAL

## 2022-11-28 VITALS
SYSTOLIC BLOOD PRESSURE: 110 MMHG | DIASTOLIC BLOOD PRESSURE: 77 MMHG | WEIGHT: 202.81 LBS | HEART RATE: 116 BPM | BODY MASS INDEX: 36 KG/M2

## 2022-11-28 DIAGNOSIS — Z34.91 ENCOUNTER FOR SUPERVISION OF NORMAL PREGNANCY IN FIRST TRIMESTER, UNSPECIFIED GRAVIDITY: Primary | ICD-10-CM

## 2022-11-28 DIAGNOSIS — Z34.03 ENCOUNTER FOR SUPERVISION OF NORMAL FIRST PREGNANCY IN THIRD TRIMESTER: ICD-10-CM

## 2022-11-28 LAB
APPEARANCE: CLEAR
BILIRUBIN: NEGATIVE
DEPRECATED RDW RBC AUTO: 46.1 FL (ref 35.1–46.3)
ERYTHROCYTE [DISTWIDTH] IN BLOOD BY AUTOMATED COUNT: 13.6 % (ref 11–15)
GLUCOSE (URINE DIPSTICK): NEGATIVE MG/DL
HCT VFR BLD AUTO: 35 %
HGB BLD-MCNC: 11.9 G/DL
KETONES (URINE DIPSTICK): NEGATIVE MG/DL
MCH RBC QN AUTO: 31.5 PG (ref 26–34)
MCHC RBC AUTO-ENTMCNC: 34 G/DL (ref 31–37)
MCV RBC AUTO: 92.6 FL
MULTISTIX LOT#: ABNORMAL NUMERIC
NITRITE, URINE: NEGATIVE
OCCULT BLOOD: NEGATIVE
PH, URINE: 7 (ref 4.5–8)
PLATELET # BLD AUTO: 154 10(3)UL (ref 150–450)
PROTEIN (URINE DIPSTICK): NEGATIVE MG/DL
RBC # BLD AUTO: 3.78 X10(6)UL
SPECIFIC GRAVITY: 1 (ref 1–1.03)
URINE-COLOR: YELLOW
UROBILINOGEN,SEMI-QN: 0.2 MG/DL (ref 0–1.9)
WBC # BLD AUTO: 12.1 X10(3) UL (ref 4–11)

## 2022-11-28 PROCEDURE — 86780 TREPONEMA PALLIDUM: CPT

## 2022-11-28 PROCEDURE — 3074F SYST BP LT 130 MM HG: CPT | Performed by: OBSTETRICS & GYNECOLOGY

## 2022-11-28 PROCEDURE — 3078F DIAST BP <80 MM HG: CPT | Performed by: OBSTETRICS & GYNECOLOGY

## 2022-11-28 PROCEDURE — 87389 HIV-1 AG W/HIV-1&-2 AB AG IA: CPT

## 2022-11-28 PROCEDURE — 85027 COMPLETE CBC AUTOMATED: CPT

## 2022-11-28 PROCEDURE — 81002 URINALYSIS NONAUTO W/O SCOPE: CPT | Performed by: OBSTETRICS & GYNECOLOGY

## 2022-11-28 PROCEDURE — 36415 COLL VENOUS BLD VENIPUNCTURE: CPT

## 2022-11-28 RX ORDER — VALACYCLOVIR HYDROCHLORIDE 1 G/1
1000 TABLET, FILM COATED ORAL DAILY
Qty: 30 TABLET | Refills: 1 | Status: SHIPPED | OUTPATIENT
Start: 2022-11-28

## 2022-11-29 ENCOUNTER — HOSPITAL ENCOUNTER (OUTPATIENT)
Dept: PERINATAL CARE | Facility: HOSPITAL | Age: 32
Discharge: HOME | End: 2022-11-29
Attending: OBSTETRICS & GYNECOLOGY
Payer: COMMERCIAL

## 2022-11-29 DIAGNOSIS — O99.210 OBESITY IN PREGNANCY: Primary | ICD-10-CM

## 2022-11-29 PROCEDURE — 59025 FETAL NON-STRESS TEST: CPT

## 2022-11-29 PROCEDURE — 59025 FETAL NON-STRESS TEST: CPT | Performed by: OBSTETRICS & GYNECOLOGY

## 2022-11-30 LAB
GROUP B STREP BY PCR FOR PCR OVT: NEGATIVE
T PALLIDUM AB SER QL: NEGATIVE

## 2022-12-02 ENCOUNTER — NST DOCUMENTATION (OUTPATIENT)
Dept: OBGYN CLINIC | Facility: CLINIC | Age: 32
End: 2022-12-02

## 2022-12-02 NOTE — NST
Nonstress Test   Patient: Hesham Jensen    Gestation: 59L9V    Diagnosis from order:   Obesity in pregnancy       NST:         NST DOCUMENTATION 2022   Variability 6-25 BPM   Accelerations Yes   Decelerations None   Baseline 135   Uterine Irritability No   Contractions Not present   Acoustic Stimulator No   Nonstress Test Interpretation Reactive   Nonstress Test Second Interpretation -   NST Completed by Crow MARTINEZ   Disposition Home    Testing Plan Weekly NST   Provider Notified Patria Vaughan DO         I agree with the above evaluation. NST completed.   Sheron Flores DO  2022  11:03 AM

## 2022-12-06 ENCOUNTER — APPOINTMENT (OUTPATIENT)
Dept: OBGYN CLINIC | Facility: HOSPITAL | Age: 32
End: 2022-12-06
Payer: COMMERCIAL

## 2022-12-06 ENCOUNTER — ROUTINE PRENATAL (OUTPATIENT)
Dept: OBGYN CLINIC | Facility: CLINIC | Age: 32
End: 2022-12-06
Payer: COMMERCIAL

## 2022-12-06 ENCOUNTER — HOSPITAL ENCOUNTER (OUTPATIENT)
Facility: HOSPITAL | Age: 32
Discharge: HOME | End: 2022-12-06
Attending: OBSTETRICS & GYNECOLOGY | Admitting: OBSTETRICS & GYNECOLOGY
Payer: COMMERCIAL

## 2022-12-06 ENCOUNTER — NST DOCUMENTATION (OUTPATIENT)
Dept: OBGYN CLINIC | Facility: CLINIC | Age: 32
End: 2022-12-06

## 2022-12-06 VITALS
DIASTOLIC BLOOD PRESSURE: 74 MMHG | SYSTOLIC BLOOD PRESSURE: 109 MMHG | BODY MASS INDEX: 37 KG/M2 | WEIGHT: 206.19 LBS | HEART RATE: 111 BPM

## 2022-12-06 VITALS — TEMPERATURE: 99 F | DIASTOLIC BLOOD PRESSURE: 69 MMHG | SYSTOLIC BLOOD PRESSURE: 130 MMHG | HEART RATE: 110 BPM

## 2022-12-06 DIAGNOSIS — Z34.91 ENCOUNTER FOR SUPERVISION OF NORMAL PREGNANCY IN FIRST TRIMESTER, UNSPECIFIED GRAVIDITY: Primary | ICD-10-CM

## 2022-12-06 LAB
BILIRUBIN: NEGATIVE
GLUCOSE (URINE DIPSTICK): NEGATIVE MG/DL
KETONES (URINE DIPSTICK): NEGATIVE MG/DL
MULTISTIX EXPIRATION DATE: ABNORMAL DATE
MULTISTIX LOT#: 4023 NUMERIC
NITRITE, URINE: NEGATIVE
OCCULT BLOOD: NEGATIVE
PH, URINE: 7.5 (ref 4.5–8)
PROTEIN (URINE DIPSTICK): NEGATIVE MG/DL
SPECIFIC GRAVITY: 1.01 (ref 1–1.03)
UROBILINOGEN,SEMI-QN: 0.2 MG/DL (ref 0–1.9)

## 2022-12-06 PROCEDURE — 59025 FETAL NON-STRESS TEST: CPT | Performed by: OBSTETRICS & GYNECOLOGY

## 2022-12-06 PROCEDURE — 3074F SYST BP LT 130 MM HG: CPT | Performed by: OBSTETRICS & GYNECOLOGY

## 2022-12-06 PROCEDURE — 81002 URINALYSIS NONAUTO W/O SCOPE: CPT | Performed by: OBSTETRICS & GYNECOLOGY

## 2022-12-06 PROCEDURE — 59025 FETAL NON-STRESS TEST: CPT

## 2022-12-06 PROCEDURE — 3078F DIAST BP <80 MM HG: CPT | Performed by: OBSTETRICS & GYNECOLOGY

## 2022-12-07 NOTE — NST
Nonstress Test   Patient: Jacinto Rios    Gestation: 37w3d    Diagnosis from order:  High BMI       NST:         NST DOCUMENTATION 2022   Variability 6-25 BPM   Accelerations Yes   Decelerations None   Baseline 140   Uterine Irritability No   Contractions Not present   Acoustic Stimulator No   Nonstress Test Interpretation Reactive   Nonstress Test Second Interpretation Reactive   FHR Category Category I   Comments  37 3/ NST for High BMI. NSt reactive. NST Completed by Cheng Patterson RN   Disposition Home    Testing Plan Weekly NST   Provider Notified Dr Navneet Matthews         I agree with the above evaluation. NST completed.   Reyes Corn, MD  2022  8:57 PM

## 2022-12-07 NOTE — PROGRESS NOTES
Pt is a 33year old female admitted to TR1/TR1-A. Patient presents with:  Non-stress Test     Pt is  37w3d intra-uterine pregnancy. History obtained, consents signed. Oriented to room, staff, and plan of care.

## 2022-12-08 ENCOUNTER — PATIENT MESSAGE (OUTPATIENT)
Dept: OBGYN CLINIC | Facility: CLINIC | Age: 32
End: 2022-12-08

## 2022-12-09 NOTE — TELEPHONE ENCOUNTER
From: Derik Salamanca  To: Juventino Patrick DO  Sent: 12/8/2022 10:13 PM CST  Subject: Flu Shot    Hi. If I choose to get a flu shot today, can I get it anywhere (ie [facility], [facility], etc) or would you recommend I get it in office? Is there a specific type I should be looking for? I will be 38w on Saturday.

## 2022-12-13 ENCOUNTER — HOSPITAL ENCOUNTER (INPATIENT)
Facility: HOSPITAL | Age: 32
LOS: 4 days | Discharge: HOME | End: 2022-12-17
Attending: OBSTETRICS & GYNECOLOGY | Admitting: OBSTETRICS & GYNECOLOGY
Payer: COMMERCIAL

## 2022-12-13 ENCOUNTER — HOSPITAL ENCOUNTER (OUTPATIENT)
Dept: PERINATAL CARE | Facility: HOSPITAL | Age: 32
Discharge: HOME | End: 2022-12-13
Attending: OBSTETRICS & GYNECOLOGY
Payer: COMMERCIAL

## 2022-12-13 DIAGNOSIS — O99.210 OBESITY IN PREGNANCY: ICD-10-CM

## 2022-12-13 DIAGNOSIS — O28.8 NST (NON-STRESS TEST) WITH DECELERATIONS: ICD-10-CM

## 2022-12-13 DIAGNOSIS — O28.8 NON-REACTIVE NST (NON-STRESS TEST): Primary | ICD-10-CM

## 2022-12-13 DIAGNOSIS — O99.210 OBESITY IN PREGNANCY: Primary | ICD-10-CM

## 2022-12-13 PROBLEM — Z34.90 PREGNANCY: Status: ACTIVE | Noted: 2022-12-13

## 2022-12-13 PROBLEM — Z34.90 PREGNANCY (HCC): Status: ACTIVE | Noted: 2022-12-13

## 2022-12-13 LAB
ANTIBODY SCREEN: NEGATIVE
BASOPHILS # BLD AUTO: 0.04 X10(3) UL (ref 0–0.2)
BASOPHILS NFR BLD AUTO: 0.3 %
DEPRECATED RDW RBC AUTO: 45.6 FL (ref 35.1–46.3)
EOSINOPHIL # BLD AUTO: 0.12 X10(3) UL (ref 0–0.7)
EOSINOPHIL NFR BLD AUTO: 0.9 %
ERYTHROCYTE [DISTWIDTH] IN BLOOD BY AUTOMATED COUNT: 13.7 % (ref 11–15)
HCT VFR BLD AUTO: 36 %
HGB BLD-MCNC: 12.3 G/DL
IMM GRANULOCYTES # BLD AUTO: 0.09 X10(3) UL (ref 0–1)
IMM GRANULOCYTES NFR BLD: 0.7 %
LYMPHOCYTES # BLD AUTO: 2.7 X10(3) UL (ref 1–4)
LYMPHOCYTES NFR BLD AUTO: 21.3 %
MCH RBC QN AUTO: 31.5 PG (ref 26–34)
MCHC RBC AUTO-ENTMCNC: 34.2 G/DL (ref 31–37)
MCV RBC AUTO: 92.1 FL
MONOCYTES # BLD AUTO: 1.01 X10(3) UL (ref 0.1–1)
MONOCYTES NFR BLD AUTO: 8 %
NEUTROPHILS # BLD AUTO: 8.74 X10 (3) UL (ref 1.5–7.7)
NEUTROPHILS # BLD AUTO: 8.74 X10(3) UL (ref 1.5–7.7)
NEUTROPHILS NFR BLD AUTO: 68.8 %
PLATELET # BLD AUTO: 174 10(3)UL (ref 150–450)
RBC # BLD AUTO: 3.91 X10(6)UL
RH BLOOD TYPE: POSITIVE
SARS-COV-2 RNA RESP QL NAA+PROBE: NOT DETECTED
WBC # BLD AUTO: 12.7 X10(3) UL (ref 4–11)

## 2022-12-13 PROCEDURE — 76819 FETAL BIOPHYS PROFIL W/O NST: CPT | Performed by: OBSTETRICS & GYNECOLOGY

## 2022-12-13 PROCEDURE — 3E0P7VZ INTRODUCTION OF HORMONE INTO FEMALE REPRODUCTIVE, VIA NATURAL OR ARTIFICIAL OPENING: ICD-10-PCS | Performed by: OBSTETRICS & GYNECOLOGY

## 2022-12-13 PROCEDURE — 76815 OB US LIMITED FETUS(S): CPT

## 2022-12-13 PROCEDURE — 76818 FETAL BIOPHYS PROFILE W/NST: CPT | Performed by: OBSTETRICS & GYNECOLOGY

## 2022-12-13 PROCEDURE — 59025 FETAL NON-STRESS TEST: CPT

## 2022-12-13 PROCEDURE — 59025 FETAL NON-STRESS TEST: CPT | Performed by: OBSTETRICS & GYNECOLOGY

## 2022-12-13 RX ORDER — TERBUTALINE SULFATE 1 MG/ML
0.25 INJECTION, SOLUTION SUBCUTANEOUS AS NEEDED
Status: DISCONTINUED | OUTPATIENT
Start: 2022-12-13 | End: 2022-12-15 | Stop reason: HOSPADM

## 2022-12-13 RX ORDER — IBUPROFEN 600 MG/1
600 TABLET ORAL EVERY 6 HOURS PRN
Status: DISCONTINUED | OUTPATIENT
Start: 2022-12-13 | End: 2022-12-15 | Stop reason: HOSPADM

## 2022-12-13 RX ORDER — LIDOCAINE HYDROCHLORIDE 10 MG/ML
30 INJECTION, SOLUTION EPIDURAL; INFILTRATION; INTRACAUDAL; PERINEURAL ONCE
Status: COMPLETED | OUTPATIENT
Start: 2022-12-13 | End: 2022-12-15

## 2022-12-13 RX ORDER — DEXTROSE, SODIUM CHLORIDE, SODIUM LACTATE, POTASSIUM CHLORIDE, AND CALCIUM CHLORIDE 5; .6; .31; .03; .02 G/100ML; G/100ML; G/100ML; G/100ML; G/100ML
INJECTION, SOLUTION INTRAVENOUS AS NEEDED
Status: DISCONTINUED | OUTPATIENT
Start: 2022-12-13 | End: 2022-12-15

## 2022-12-13 RX ORDER — ONDANSETRON 2 MG/ML
4 INJECTION INTRAMUSCULAR; INTRAVENOUS EVERY 6 HOURS PRN
Status: DISCONTINUED | OUTPATIENT
Start: 2022-12-13 | End: 2022-12-15 | Stop reason: HOSPADM

## 2022-12-13 RX ORDER — AMMONIA INHALANTS 0.04 G/.3ML
0.3 INHALANT RESPIRATORY (INHALATION) AS NEEDED
Status: DISCONTINUED | OUTPATIENT
Start: 2022-12-13 | End: 2022-12-15 | Stop reason: HOSPADM

## 2022-12-13 RX ORDER — TRISODIUM CITRATE DIHYDRATE AND CITRIC ACID MONOHYDRATE 500; 334 MG/5ML; MG/5ML
30 SOLUTION ORAL AS NEEDED
Status: DISCONTINUED | OUTPATIENT
Start: 2022-12-13 | End: 2022-12-15 | Stop reason: HOSPADM

## 2022-12-13 RX ORDER — ACETAMINOPHEN 500 MG
500 TABLET ORAL EVERY 6 HOURS PRN
Status: DISCONTINUED | OUTPATIENT
Start: 2022-12-13 | End: 2022-12-15 | Stop reason: HOSPADM

## 2022-12-13 RX ORDER — SODIUM CHLORIDE, SODIUM LACTATE, POTASSIUM CHLORIDE, CALCIUM CHLORIDE 600; 310; 30; 20 MG/100ML; MG/100ML; MG/100ML; MG/100ML
INJECTION, SOLUTION INTRAVENOUS CONTINUOUS
Status: DISCONTINUED | OUTPATIENT
Start: 2022-12-13 | End: 2022-12-15

## 2022-12-14 ENCOUNTER — ANESTHESIA EVENT (OUTPATIENT)
Dept: OBGYN UNIT | Facility: HOSPITAL | Age: 32
End: 2022-12-14
Payer: COMMERCIAL

## 2022-12-14 ENCOUNTER — ANESTHESIA (OUTPATIENT)
Dept: OBGYN UNIT | Facility: HOSPITAL | Age: 32
End: 2022-12-14
Payer: COMMERCIAL

## 2022-12-14 RX ORDER — HYDROXYZINE HYDROCHLORIDE 50 MG/ML
50 INJECTION, SOLUTION INTRAMUSCULAR
Status: DISCONTINUED | OUTPATIENT
Start: 2022-12-14 | End: 2022-12-15

## 2022-12-14 RX ORDER — BUPIVACAINE HYDROCHLORIDE 2.5 MG/ML
INJECTION, SOLUTION EPIDURAL; INFILTRATION; INTRACAUDAL AS NEEDED
Status: DISCONTINUED | OUTPATIENT
Start: 2022-12-14 | End: 2022-12-15 | Stop reason: SURG

## 2022-12-14 RX ORDER — BUPIVACAINE HCL/0.9 % NACL/PF 0.25 %
5 PLASTIC BAG, INJECTION (ML) EPIDURAL AS NEEDED
Status: DISCONTINUED | OUTPATIENT
Start: 2022-12-14 | End: 2022-12-15

## 2022-12-14 RX ORDER — NALBUPHINE HCL 10 MG/ML
10 AMPUL (ML) INJECTION
Status: DISCONTINUED | OUTPATIENT
Start: 2022-12-14 | End: 2022-12-15

## 2022-12-14 RX ORDER — NALBUPHINE HCL 10 MG/ML
2.5 AMPUL (ML) INJECTION
Status: DISCONTINUED | OUTPATIENT
Start: 2022-12-14 | End: 2022-12-15

## 2022-12-14 RX ORDER — BUPIVACAINE HYDROCHLORIDE 2.5 MG/ML
20 INJECTION, SOLUTION EPIDURAL; INFILTRATION; INTRACAUDAL ONCE
Status: DISCONTINUED | OUTPATIENT
Start: 2022-12-14 | End: 2022-12-15 | Stop reason: HOSPADM

## 2022-12-14 RX ORDER — LIDOCAINE HYDROCHLORIDE AND EPINEPHRINE 15; 5 MG/ML; UG/ML
INJECTION, SOLUTION EPIDURAL
Status: COMPLETED | OUTPATIENT
Start: 2022-12-14 | End: 2022-12-14

## 2022-12-14 RX ADMIN — LIDOCAINE HYDROCHLORIDE AND EPINEPHRINE 3 ML: 15; 5 INJECTION, SOLUTION EPIDURAL at 22:35:00

## 2022-12-14 RX ADMIN — BUPIVACAINE HYDROCHLORIDE 0.4 ML: 2.5 INJECTION, SOLUTION EPIDURAL; INFILTRATION; INTRACAUDAL at 22:34:00

## 2022-12-14 NOTE — PROGRESS NOTES
Pt is a 33year old female admitted to 375/375-A. Patient presents with:  Scheduled Induction: [place]     Pt is  38w3d intra-uterine pregnancy. History obtained, consents signed. Oriented to room, staff, and plan of care.

## 2022-12-14 NOTE — PLAN OF CARE
Problem: BIRTH - VAGINAL/ SECTION  Goal: Fetal and maternal status remain reassuring during the birth process  Description: INTERVENTIONS:  - Monitor vital signs  - Monitor fetal heart rate  - Monitor uterine activity  - Monitor labor progression (vaginal delivery)  - DVT prophylaxis (C/S delivery)  - Surgical antibiotic prophylaxis (C/S delivery)  Outcome: Progressing     Problem: PAIN - ADULT  Goal: Verbalizes/displays adequate comfort level or patient's stated pain goal  Description: INTERVENTIONS:  - Encourage pt to monitor pain and request assistance  - Assess pain using appropriate pain scale  - Administer analgesics based on type and severity of pain and evaluate response  - Implement non-pharmacological measures as appropriate and evaluate response  - Consider cultural and social influences on pain and pain management  - Manage/alleviate anxiety  - Utilize distraction and/or relaxation techniques  - Monitor for opioid side effects  - Notify MD/LIP if interventions unsuccessful or patient reports new pain  - Anticipate increased pain with activity and pre-medicate as appropriate  Outcome: Progressing     Problem: ANXIETY  Goal: Will report anxiety at manageable levels  Description: INTERVENTIONS:  - Administer medication as ordered  - Teach and rehearse alternative coping skills  - Provide emotional support with 1:1 interaction with staff  Outcome: Progressing     Problem: Patient/Family Goals  Goal: Patient/Family Long Term Goal  Description: Patient's Long Term Goal: Uncomplicated Delivery     Interventions:  - Assessment/Monitoring  - Induction/Augmentation per protocol and Provider order  - C/S per protocol and Provider order   - Education  - Intervention per protocol and Provider order with education   - Involve patient in POC  - See additional Care Plan goals for specific interventions      Outcome: Progressing  Goal: Patient/Family Short Term Goal  Description: Patient's Short Term Goal: Comfort and Pain Control     Interventions:   - Non Pharmacological pain intervention   - IV/IM and Epidural pain medication per Provider order and patient request  - Education  - Involve Patient in POC   - See additional Care Plan goals for specific interventions        Outcome: Progressing     Problem: Patient Centered Care  Goal: Patient preferences are identified and integrated in the patient's plan of care  Description: Interventions:  - What would you like us to know as we care for you?  Fear of needles   - Provide timely, complete, and accurate information to patient/family  - Incorporate patient and family knowledge, values, beliefs, and cultural backgrounds into the planning and delivery of care  - Encourage patient/family to participate in care and decision-making at the level they choose  - Honor patient and family perspectives and choices  Outcome: Progressing

## 2022-12-15 ENCOUNTER — NST DOCUMENTATION (OUTPATIENT)
Dept: OBGYN CLINIC | Facility: CLINIC | Age: 32
End: 2022-12-15

## 2022-12-15 PROCEDURE — 0KQM0ZZ REPAIR PERINEUM MUSCLE, OPEN APPROACH: ICD-10-PCS | Performed by: OBSTETRICS & GYNECOLOGY

## 2022-12-15 PROCEDURE — 59400 OBSTETRICAL CARE: CPT | Performed by: OBSTETRICS & GYNECOLOGY

## 2022-12-15 PROCEDURE — 3E033VJ INTRODUCTION OF OTHER HORMONE INTO PERIPHERAL VEIN, PERCUTANEOUS APPROACH: ICD-10-PCS | Performed by: OBSTETRICS & GYNECOLOGY

## 2022-12-15 RX ORDER — ACETAMINOPHEN 500 MG
1000 TABLET ORAL EVERY 6 HOURS PRN
Status: DISCONTINUED | OUTPATIENT
Start: 2022-12-15 | End: 2022-12-17

## 2022-12-15 RX ORDER — DOCUSATE SODIUM 100 MG/1
100 CAPSULE, LIQUID FILLED ORAL
Status: DISCONTINUED | OUTPATIENT
Start: 2022-12-15 | End: 2022-12-17

## 2022-12-15 RX ORDER — TRANEXAMIC ACID 10 MG/ML
INJECTION, SOLUTION INTRAVENOUS
Status: DISPENSED
Start: 2022-12-15 | End: 2022-12-15

## 2022-12-15 RX ORDER — METHYLERGONOVINE MALEATE 0.2 MG/ML
INJECTION INTRAVENOUS
Status: DISPENSED
Start: 2022-12-15 | End: 2022-12-15

## 2022-12-15 RX ORDER — CHOLECALCIFEROL (VITAMIN D3) 25 MCG
1 TABLET,CHEWABLE ORAL DAILY
Status: DISCONTINUED | OUTPATIENT
Start: 2022-12-15 | End: 2022-12-17

## 2022-12-15 RX ORDER — METHYLERGONOVINE MALEATE 0.2 MG/ML
0.2 INJECTION INTRAVENOUS ONCE AS NEEDED
Status: ACTIVE | OUTPATIENT
Start: 2022-12-15 | End: 2022-12-15

## 2022-12-15 RX ORDER — IBUPROFEN 600 MG/1
600 TABLET ORAL EVERY 6 HOURS PRN
Status: DISCONTINUED | OUTPATIENT
Start: 2022-12-15 | End: 2022-12-17

## 2022-12-15 RX ORDER — DEXTROSE, SODIUM CHLORIDE, SODIUM LACTATE, POTASSIUM CHLORIDE, AND CALCIUM CHLORIDE 5; .6; .31; .03; .02 G/100ML; G/100ML; G/100ML; G/100ML; G/100ML
INJECTION, SOLUTION INTRAVENOUS CONTINUOUS
Status: DISCONTINUED | OUTPATIENT
Start: 2022-12-15 | End: 2022-12-15

## 2022-12-15 RX ORDER — SIMETHICONE 80 MG
80 TABLET,CHEWABLE ORAL 3 TIMES DAILY PRN
Status: DISCONTINUED | OUTPATIENT
Start: 2022-12-15 | End: 2022-12-17

## 2022-12-15 RX ORDER — ACETAMINOPHEN 500 MG
500 TABLET ORAL EVERY 6 HOURS PRN
Status: DISCONTINUED | OUTPATIENT
Start: 2022-12-15 | End: 2022-12-17

## 2022-12-15 RX ORDER — AMMONIA INHALANTS 0.04 G/.3ML
0.3 INHALANT RESPIRATORY (INHALATION) AS NEEDED
Status: DISCONTINUED | OUTPATIENT
Start: 2022-12-15 | End: 2022-12-17

## 2022-12-15 RX ORDER — ONDANSETRON 2 MG/ML
4 INJECTION INTRAMUSCULAR; INTRAVENOUS EVERY 6 HOURS PRN
Status: DISCONTINUED | OUTPATIENT
Start: 2022-12-15 | End: 2022-12-17

## 2022-12-15 RX ORDER — BISACODYL 10 MG
10 SUPPOSITORY, RECTAL RECTAL ONCE AS NEEDED
Status: DISCONTINUED | OUTPATIENT
Start: 2022-12-15 | End: 2022-12-17

## 2022-12-15 RX ORDER — DIAPER,BRIEF,INFANT-TODD,DISP
1 EACH MISCELLANEOUS EVERY 6 HOURS PRN
Status: DISCONTINUED | OUTPATIENT
Start: 2022-12-15 | End: 2022-12-17

## 2022-12-15 RX ORDER — MISOPROSTOL 200 UG/1
TABLET ORAL
Status: DISPENSED
Start: 2022-12-15 | End: 2022-12-15

## 2022-12-15 NOTE — PROGRESS NOTES
Pt with SROM yesterday afternoon @ 3pm.  Pt made progress to 5cm then 8.5cm this AM.  Pitocin augmentation started due to UCs q5m. Pt then 10cm and started pushing at 915am.  I have been in and out of the room checking on progress. Pt with great effort and head is descending with pushing. FHT with small variables when pushing. Anticipate .

## 2022-12-15 NOTE — PROGRESS NOTES
Patient up to bathroom with assist x 2. Unable to void at this time. Patient transferred to mother/baby room 370 per wheelchair in stable condition with baby and personal belongings. Accompanied by significant other and staff.   Report given to mother/baby JUAN Ruiz

## 2022-12-15 NOTE — PROGRESS NOTES
Received patient into room 370 via WC . Bedside shift report received from [employer]. Pt transferred to bed. Bed in lowest and locked position. Side rails up x2. VSS. IV site unremarkable. Baby present in open crib. ID bands matched with L&D RN. Patient and family oriented to unit, room and call light within reach. Safety measures in place, POC followed. Will continue to monitor per protocol. Advised to call for assistance to bathroom.

## 2022-12-15 NOTE — PROGRESS NOTES
Pt with SROM at 3pm.  I saw her at 4pm and cx 1/80/-3 to -2. Prominent pubic arch and pt doesn't tolerate exams well. Notified by RN that she just received nubain and vistril due to pain. Cx now 2/80/-2 to -3. FHT cat 1 and UCs q2-3m spontaneously. Continue expectant mgmt as pt now in labor s/p SROM with from cytotec. Will consider pitocin augmentation if UCs space or no further change.   POC d/w pt pt and ; all questions answered

## 2022-12-15 NOTE — ANESTHESIA POSTPROCEDURE EVALUATION
Patient: Bayron Jones    Procedure Summary     Date: 12/14/22 Room / Location:     Anesthesia Start: 3460 Anesthesia Stop: 12/15/22 1337    Procedure: LABOR ANALGESIA Diagnosis:     Scheduled Providers:  Anesthesiologist: Jackie Rogers MD    Anesthesia Type: epidural ASA Status: 2          Anesthesia Type: epidural    Vitals Value Taken Time   BP  12/15/22 1411   Temp  12/15/22 1411   Pulse  12/15/22 1411   Resp  12/15/22 1411   SpO2  12/15/22 1411       [facility] AN Post Evaluation:   Patient Evaluated in PACU  Patient Participation: complete - patient participated  Level of Consciousness: awake  Pain Score: 0  Pain Management: adequate  Airway Patency:patent  Dental exam unchanged from preop  Yes    Cardiovascular Status: acceptable  Respiratory Status: acceptable  Postoperative Hydration acceptable      Fabienne Brush MD  12/15/2022 2:11 PM

## 2022-12-15 NOTE — NST
Nonstress Test   Patient: Justino Mcadams    Gestation: 31Z2C    Diagnosis from order:         NST:         NST DOCUMENTATION 2022   Variability 6-25 BPM   Accelerations Yes   Decelerations Late   Baseline 140   Uterine Irritability No   Contractions Irregular   Acoustic Stimulator No   Nonstress Test Interpretation Reactive   Nonstress Test Second Interpretation -   FHR Category -   Comments Reactive tracing   CTX with  early/late decel  reactive tracing to follow   BPP ordered  5.2 VIRGIL  Spoke to Dr Santos Pt to [facility] for IOL   NST Completed by Crow MRATINEZ   Disposition [facility]    Testing Plan -   Provider Notified Tracy Santos MD         I agree with the above evaluation. NST completed. Pt sent to [facility] for monitoring and labs. Tiffany Santos MD  12/15/2022  9:03 AM

## 2022-12-15 NOTE — PLAN OF CARE
Problem: BIRTH - VAGINAL/ SECTION  Goal: Fetal and maternal status remain reassuring during the birth process  Description: INTERVENTIONS:  - Monitor vital signs  - Monitor fetal heart rate  - Monitor uterine activity  - Monitor labor progression (vaginal delivery)  - DVT prophylaxis (C/S delivery)  - Surgical antibiotic prophylaxis (C/S delivery)  Outcome: Completed   Pt.  Delivered healthy baby girl

## 2022-12-15 NOTE — ANESTHESIA PROCEDURE NOTES
Labor Analgesia    Date/Time: 12/14/2022 10:30 PM  Performed by: Akhil Merino MD  Authorized by: Akhil Merino MD       General Information and Staff    Start Time:  12/14/2022 10:30 PM  End Time:  12/14/2022 10:35 PM  Anesthesiologist:  Akhil Merino MD  Performed by:   Anesthesiologist  Patient Location:  OB  Site Identification: surface landmarks    Reason for Block: labor epidural    Preanesthetic Checklist: patient identified, IV checked, site marked, risks and benefits discussed, monitors and equipment checked, pre-op evaluation, timeout performed, anesthesia consent and sterile technique used      Procedure Details    Patient Position:  Sitting  Prep: ChloraPrep    Monitoring:  Heart rate  Approach:  Midline    Epidural Needle    Injection Technique:  OMAR saline  Needle Type:  Tuohy  Needle Gauge:  18 G  Needle Length:  3.5 in  Needle Insertion Depth:  7  Location:  L3-4    Spinal Needle    Needle Type:  Pencil-tip  Needle Gauge:  27 G    Catheter    Catheter Type:  Multi-orifice  Catheter Size:  20 G  Catheter at Skin Depth:  12  Test Dose:  Negative    Assessment    Sensory Level:  T10    Additional Comments

## 2022-12-16 LAB
BASOPHILS # BLD AUTO: 0.07 X10(3) UL (ref 0–0.2)
BASOPHILS NFR BLD AUTO: 0.4 %
DEPRECATED RDW RBC AUTO: 46.5 FL (ref 35.1–46.3)
EOSINOPHIL # BLD AUTO: 0.26 X10(3) UL (ref 0–0.7)
EOSINOPHIL NFR BLD AUTO: 1.4 %
ERYTHROCYTE [DISTWIDTH] IN BLOOD BY AUTOMATED COUNT: 13.9 % (ref 11–15)
HCT VFR BLD AUTO: 27.3 %
HGB BLD-MCNC: 9.4 G/DL
IMM GRANULOCYTES # BLD AUTO: 0.13 X10(3) UL (ref 0–1)
IMM GRANULOCYTES NFR BLD: 0.7 %
LYMPHOCYTES # BLD AUTO: 3.47 X10(3) UL (ref 1–4)
LYMPHOCYTES NFR BLD AUTO: 19.2 %
MCH RBC QN AUTO: 32.1 PG (ref 26–34)
MCHC RBC AUTO-ENTMCNC: 34.4 G/DL (ref 31–37)
MCV RBC AUTO: 93.2 FL
MONOCYTES # BLD AUTO: 1.49 X10(3) UL (ref 0.1–1)
MONOCYTES NFR BLD AUTO: 8.2 %
NEUTROPHILS # BLD AUTO: 12.68 X10 (3) UL (ref 1.5–7.7)
NEUTROPHILS # BLD AUTO: 12.68 X10(3) UL (ref 1.5–7.7)
NEUTROPHILS NFR BLD AUTO: 70.1 %
PLATELET # BLD AUTO: 145 10(3)UL (ref 150–450)
RBC # BLD AUTO: 2.93 X10(6)UL
WBC # BLD AUTO: 18.1 X10(3) UL (ref 4–11)

## 2022-12-16 NOTE — PLAN OF CARE
Problem: PAIN - ADULT  Goal: Verbalizes/displays adequate comfort level or patient's stated pain goal  Description: INTERVENTIONS:  - Encourage pt to monitor pain and request assistance  - Assess pain using appropriate pain scale  - Administer analgesics based on type and severity of pain and evaluate response  - Implement non-pharmacological measures as appropriate and evaluate response  - Consider cultural and social influences on pain and pain management  - Manage/alleviate anxiety  - Utilize distraction and/or relaxation techniques  - Monitor for opioid side effects  - Notify MD/LIP if interventions unsuccessful or patient reports new pain  - Anticipate increased pain with activity and pre-medicate as appropriate  Outcome: Progressing     Problem: ANXIETY  Goal: Will report anxiety at manageable levels  Description: INTERVENTIONS:  - Administer medication as ordered  - Teach and rehearse alternative coping skills  - Provide emotional support with 1:1 interaction with staff  Outcome: Progressing     Problem: Patient/Family Goals  Goal: Patient/Family Long Term Goal  Description: Patient's Long Term Goal: Uncomplicated Delivery     Interventions:  - Assessment/Monitoring  - Induction/Augmentation per protocol and Provider order  - C/S per protocol and Provider order   - Education  - Intervention per protocol and Provider order with education   - Involve patient in POC  - See additional Care Plan goals for specific interventions      Outcome: Progressing  Goal: Patient/Family Short Term Goal  Description: Patient's Short Term Goal: Comfort and Pain Control     Interventions:   - Non Pharmacological pain intervention   - IV/IM and Epidural pain medication per Provider order and patient request  - Education  - Involve Patient in POC   - See additional Care Plan goals for specific interventions        Outcome: Progressing     Problem: Patient Centered Care  Goal: Patient preferences are identified and integrated in the patient's plan of care  Description: Interventions:  - What would you like us to know as we care for you? Fear of needles   - Provide timely, complete, and accurate information to patient/family  - Incorporate patient and family knowledge, values, beliefs, and cultural backgrounds into the planning and delivery of care  - Encourage patient/family to participate in care and decision-making at the level they choose  - Honor patient and family perspectives and choices  Outcome: Progressing     Problem: POSTPARTUM  Goal: Long Term Goal:Experiences normal postpartum course  Description: INTERVENTIONS:  - Assess and monitor vital signs and lab values. - Assess fundus and lochia. - Provide ice/sitz baths for perineum discomfort. - Monitor healing of incision/episiotomy/laceration, and assess for signs and symptoms of infection and hematoma. - Assess bladder function and monitor for bladder distention.  - Provide/instruct/assist with pericare as needed. - Provide VTE prophylaxis as needed. - Monitor bowel function.  - Encourage ambulation and provide assistance as needed. - Assess and monitor emotional status and provide social service/psych resources as needed. - Utilize standard precautions and use personal protective equipment as indicated. Ensure aseptic care of all intravenous lines and invasive tubes/drains.  - Obtain immunization and exposure to communicable diseases history. Outcome: Progressing  Goal: Optimize infant feeding at the breast  Description: INTERVENTIONS:  - Initiate breast feeding within first hour after birth. - Monitor effectiveness of current breast feeding efforts. - Assess support systems available to mother/family.  - Identify cultural beliefs/practices regarding lactation, letdown techniques, maternal food preferences.   - Assess mother's knowledge and previous experience with breast feeding.  - Provide information as needed about early infant feeding cues (e.g., rooting, lip smacking, sucking fingers/hand) versus late cue of crying.  - Discuss/demonstrate breast feeding aids (e.g., infant sling, nursing footstool/pillows, and breast pumps). - Encourage mother/other family members to express feelings/concerns, and actively listen. - Educate father/SO about benefits of breast feeding and how to manage common lactation challenges. - Recommend avoidance of specific medications or substances incompatible with breast feeding.  - Assess and monitor for signs of nipple pain/trauma. - Instruct and provide assistance with proper latch. - Review techniques for milk expression (breast pumping) and storage of breast milk. Provide pumping equipment/supplies, instructions and assistance, as needed. - Encourage rooming-in and breast feeding on demand.  - Encourage skin-to-skin contact. - Provide LC support as needed. - Assess for and manage engorgement. - Provide breast feeding education handouts and information on community breast feeding support. Outcome: Progressing  Goal: Establishment of adequate milk supply with medication/procedure interruptions  Description: INTERVENTIONS:  - Review techniques for milk expression (breast pumping). - Provide pumping equipment/supplies, instructions, and assistance until it is safe to breastfeed infant. Outcome: Progressing  Goal: Experiences normal breast weaning course  Description: INTERVENTIONS:  - Assess for and manage engorgement. - Instruct on breast care. - Provide comfort measures. Outcome: Progressing  Goal: Appropriate maternal -  bonding  Description: INTERVENTIONS:  - Assess caregiver- interactions. - Assess caregiver's emotional status and coping mechanisms. - Encourage caregiver to participate in  daily care. - Assess support systems available to mother/family.  - Provide /case management support as needed.   Outcome: Progressing

## 2022-12-16 NOTE — PROGRESS NOTES
[facility]    Vaginal Delivery Note    Rosa M Gilliam Patient Status:  Inpatient    1990 MRN [id number]   Location [facility] 3SE Attending Jassi Rdz,    Hosp Day # 2 PCP Virgen Lehman MD     Delivery     Infant Info:  Date of Delivery: 12/15/2022   Time of Delivery: 1:28 PM  Delivery Type: Normal spontaneous vaginal delivery    Live Information for the patient's : Chris Browne, Girl [[id number]]   female infant Information for the patient's : Chris Browne, Girl [[id number]]   7 lb 6.2 oz (3.35 kg)   Apgars:  1 minute: 8   Ly               5 minutes: 9                        10 minutes:      Presentation Vertex [1]    Position Right [3] Occiput [1] Anterior [1]    Delivery Narrative: Patient pushed for 4 hours and 13 minutes prior to delivering a live male infant over intact perineum in ANIKA position. No nuchal cord noted. Infant was bulb suctioned prior to delivering in toto. Cord doubly clamped & cut. Infant handed to awaiting mother. Placenta delivered spontaneously, intact and normal in appearance with 3 vessel cord. Second degree laceration repaired with 2-0 Chromic in normal usual fashion. Sphincter intact by visual and digital exam. No sulcus, periuretheral, nor cervical lacerations noted. Mother in stable condition. Maternal Anesthesia: epidural       Delivery Complications:  none    Neonatologist Present: no    Placenta info:  Date/Time of Delivery: 12/15/2022  1:37 PM   Delivery: spontaneous  Placenta to Pathology: yes    Cord info:  Cord Gases Submitted: no  Cord Blood Collection: no  Cord Tissue Collection: no  Cord Complications: none    Sponge and Needle Counts:  Verified    QBL:  520 ml    Marcos CALIXTO  [address] [place] [address]   12/15/2022  11:55 PM

## 2022-12-16 NOTE — LACTATION NOTE
LACTATION NOTE - MOTHER      Evaluation Type: Inpatient    Problems identified  Problems identified: Milk supply not WNL; Unable to acheive sustained latch;Knowledge deficit; Nipple trauma; Nipple pain  Milk supply not WNL: Reduced (potential)  Problems Identified Other: Infant not latching; nipple shield and formula supplement    Maternal history  Maternal history: Obesity; Induction of labor    Breastfeeding goal  Breastfeeding goal: To maintain breast milk feeding per patient goal    Maternal Assessment  Bilateral Breasts: Soft;Symmetrical  Bilateral Nipples: Sore;Slightly everted/short;Colostrum difficult to express  Right Nipple: Scab  Left Nipple: Bruised; Compression stripe  Prior breastfeeding experience (comment below): Primip  Breastfeeding Assistance: Breastfeeding assistance provided with permission    Pain assessment  Pain, additional: Pinching;Pain location  Pain Location: Nipples  Treatment of Sore Nipples: Deeper latch techniques; Lanolin    Guidelines for use of:  Equipment: Nipple shield; Lanolin  Breast pump type: Ameda Platinum;Spectra (Using 22.5 mm inserts)  Current use of pump[de-identified] Initiated  Suggested use of pump: Pump 8-12X/24hr  Other (comment): Mom having difficulty latching infant and hand expressing/spoon-feeding. Assisted with latch in football hold, brief latch obtained but fussy and frequently popping off and latch painful for mom. Relatched using nipple shield and latch more comfortable for mom; few minutes then infant asleep. Educated on nipple shield shield use, indications for pumping, guidelines for supplementing and nipple care. Breast pump initiated, demonstraed paced bottle feeding to mom and dad and infant intake 10 ml Enfamil. Feeding plan discussed, will continue BF attempts using nipple shield and droplets of formula; infant to receive formula supplement and mom to pump 8x/day.

## 2022-12-16 NOTE — LACTATION NOTE
This note was copied from a baby's chart. LACTATION NOTE - INFANT    Evaluation Type  Evaluation Type: Inpatient    Problems & Assessment  Problems Diagnosed or Identified: Latch difficulty;  feeding problem; Shallow latch;37-38 weeks gestation;Disorganized suck  Infant Assessment: Hunger cues present  Muscle tone: Appropriate for GA    Feeding Assessment  Summary Current Feeding: Adlib;Breastfeeding using a nipple shield;Breastfeeding with formula supplement; Infant not latching to breast  Breastfeeding Assessment: Assisted with breastfeeding w/mother's permission;Nipple shield initiated with non-nutritive suckling; Fussy infant, unable to sustain suckling to breast;No sustained latch to breast;PO supplement followed breastfeeding  Breastfeeding Positions: football;right breast  Latch: Repeated attempts, hold nipple in mouth, stimulate to suck  Audible Sucks/Swallows: A few with stimulation  Type of Nipple: Everted (after stimulation)  Comfort (Breast/Nipple): Filling, red/small blisters/bruises, mild/mod discomfort  Hold (Positioning): Full assist, staff holds infant at breast  LATCH Score: 5         Pre/Post Weights  Supplement Type: Formula  Supplement total, ml: 10    Equipment used  Equipment used: Bottle with slow flow nipple; Nipple Shield  Nipple shield size: 20 mm

## 2022-12-17 VITALS
HEART RATE: 91 BPM | DIASTOLIC BLOOD PRESSURE: 65 MMHG | WEIGHT: 206 LBS | TEMPERATURE: 98 F | OXYGEN SATURATION: 98 % | BODY MASS INDEX: 36.5 KG/M2 | SYSTOLIC BLOOD PRESSURE: 108 MMHG | RESPIRATION RATE: 16 BRPM | HEIGHT: 62.99 IN

## 2022-12-17 NOTE — LACTATION NOTE
LACTATION NOTE - MOTHER      Evaluation Type: Inpatient    Problems identified  Problems identified: Knowledge deficit;Milk supply not WNL  Milk supply not WNL: Reduced (potential)  Problems Identified Other: sleepy infant, painful latch, infant jaundice LIRZ, supplementing/pumping    Maternal history  Maternal history: Induction of labor;Obesity  Other/comment: Oligiohydraminos    Breastfeeding goal  Breastfeeding goal: To maintain breast milk feeding per patient goal    Maternal Assessment  Bilateral Breasts: Soft;Symmetrical  Bilateral Nipples: Slightly everted/short; Sore  Prior breastfeeding experience (comment below): Primip  Breastfeeding Assistance: [facility] assistance declined at this time    Pain assessment  Pain, additional: Pinching;Pain location;Pain w/pumping  Pain Location: Nipples  Pain scale comment: pumping pain improved with application of nipple cream from home prior to pump session. silverettes in use  Treatment of Sore Nipples: Coconut oil;Deeper latch techniques; Expressed breast milk    Guidelines for use of:  Breast pump type: Ameda Platinum;Spectra  Suggested use of pump: Pump 8-12X/24hr  Other (comment): Reviewed continued lactation support via warn line and OP clinic services. Reviewed current plan of BF/pumping/supplementing.

## 2023-01-03 ENCOUNTER — TELEPHONE (OUTPATIENT)
Dept: OBGYN CLINIC | Facility: CLINIC | Age: 33
End: 2023-01-03

## 2023-01-03 ENCOUNTER — OFFICE VISIT (OUTPATIENT)
Dept: OBGYN CLINIC | Facility: CLINIC | Age: 33
End: 2023-01-03
Payer: COMMERCIAL

## 2023-01-03 ENCOUNTER — PATIENT MESSAGE (OUTPATIENT)
Dept: OBGYN CLINIC | Facility: CLINIC | Age: 33
End: 2023-01-03

## 2023-01-03 VITALS
SYSTOLIC BLOOD PRESSURE: 112 MMHG | HEART RATE: 76 BPM | DIASTOLIC BLOOD PRESSURE: 79 MMHG | BODY MASS INDEX: 33 KG/M2 | WEIGHT: 188.38 LBS

## 2023-01-03 DIAGNOSIS — R10.2 PERINEAL PAIN: Primary | ICD-10-CM

## 2023-01-03 DIAGNOSIS — K64.9 HEMORRHOIDS, UNSPECIFIED HEMORRHOID TYPE: ICD-10-CM

## 2023-01-03 PROCEDURE — 3074F SYST BP LT 130 MM HG: CPT | Performed by: OBSTETRICS & GYNECOLOGY

## 2023-01-03 PROCEDURE — 3078F DIAST BP <80 MM HG: CPT | Performed by: OBSTETRICS & GYNECOLOGY

## 2023-01-03 PROCEDURE — 99213 OFFICE O/P EST LOW 20 MIN: CPT | Performed by: OBSTETRICS & GYNECOLOGY

## 2023-01-03 NOTE — TELEPHONE ENCOUNTER
Patient has been having increasing perineal pain for the past few days. Late yesterday she started feeling light headed. Please advise.

## 2023-01-09 ENCOUNTER — NURSE ONLY (OUTPATIENT)
Dept: LACTATION | Facility: HOSPITAL | Age: 33
End: 2023-01-09
Attending: OBSTETRICS & GYNECOLOGY
Payer: COMMERCIAL

## 2023-01-09 PROCEDURE — 99213 OFFICE O/P EST LOW 20 MIN: CPT

## 2023-01-09 NOTE — PATIENT INSTRUCTIONS
BF on demand and keep on breast as long as you continue to hear swallows (1 swallow per 4-5 sucks, goal is 1 swallow per 2-3 sucks). Total BF time 20-30 minutes maximum. Supplement with minimum 3 oz BM or formula. If bottle feeding in lieu of BF, offer 3-4 oz BM or formula. Increase as tolerated. Total minimum feedings 8 in 24 hours. Consider using bottles with ventilation system to decrease infant gassiness (Dr. El Doe). Pump 8 times per day, including overnight. Size down to size 22 mm flanges. Follow up with pediatrician as recommended, discuss pediatric dentist consult recommendations (see referral sheet). Follow up with Lactation in 1-2 weeks, or after pediatric dentist consult.

## 2023-01-09 NOTE — PROGRESS NOTES
Situation: Mom and baby present to [facility] for latch assessment. Mom is experiencing painful latch and nipple soreness. Birth Weight: 3350 g / 7#6. 2  Weight on 1/5/23 ped's visit: 3714 g / 8#3  Today's Weight: 3770 g / 8#5    Background: Initially infant not latching/refusing breast, so mom has been pumping and formula supplementing since Day 1. Once mom's milk came in, baby taking breast 4-5 times per day and bottle feeds 3 times per day. BF sessions last about 45-60 minutes, bottle intake usually about 3 oz. Baby has been followed closely by pediatrician for weight loss, but at last visit on 1/5/23 weight gain ok. Mom continues to supplement with formula and BM and pumps 2-3 times per day. Assessment:   Oral assessment findings slight white coating on tongue with u-shaped tongue, with difficulty extending tongue past gum line and elevating. Digital exam noted strong, coordinated suck. Baby quickly latched in cradle hold, with 5 sucks per 1 swallow observed. After few minutes, began pulling on nipple and burp break was given. After re-latching, swallowing decreased to about 1 swallow per 9-10 sucks. Baby placed in laid back and also transferred to second breast but suck/swallow ratio remained 9-10 sucks per 1 swallow. While at breast, baby intermittently pulling on nipple with loud passing of flatus and explosive stool observed. BF concluded after 25 minutes and baby supplemented with 2 oz formula. Mom pumped using Spectra and sized down to 22.5 mm flanges, expressed 45 ml BM total.  Total milk transfer of 16 mls. Recommendations:  BF on demand and keep on breast as long as you continue to hear swallows (1 swallow per 4-5 sucks, goal is 1 swallow per 2-3 sucks). Total BF time 20-30 minutes maximum. Supplement with minimum 3 oz BM or formula. If bottle feeding in lieu of BF, offer 3-4 oz BM or formula. Increase as tolerated. Total minimum feedings 8 in 24 hours.    Consider using bottles with ventilation system to decrease infant gassiness (Dr. Yordy Doe). Pump 8 times per day, including overnight. Size down to size 22 mm flanges. Follow up with pediatrician as recommended, discuss pediatric dentist consult recommendations (see referral sheet). Follow up with Lactation in 1-2 weeks, or after pediatric dentist consult.

## 2023-01-12 ENCOUNTER — TELEPHONE (OUTPATIENT)
Dept: OBGYN UNIT | Facility: HOSPITAL | Age: 33
End: 2023-01-12

## 2023-01-27 ENCOUNTER — TELEPHONE (OUTPATIENT)
Dept: OBGYN CLINIC | Facility: CLINIC | Age: 33
End: 2023-01-27

## 2023-01-27 ENCOUNTER — POSTPARTUM (OUTPATIENT)
Dept: OBGYN CLINIC | Facility: CLINIC | Age: 33
End: 2023-01-27

## 2023-01-27 VITALS
HEART RATE: 76 BPM | SYSTOLIC BLOOD PRESSURE: 108 MMHG | WEIGHT: 186 LBS | DIASTOLIC BLOOD PRESSURE: 74 MMHG | BODY MASS INDEX: 33 KG/M2

## 2023-01-27 DIAGNOSIS — D25.1 INTRAMURAL UTERINE FIBROID: ICD-10-CM

## 2023-01-27 PROBLEM — Z34.90 PREGNANCY (HCC): Status: RESOLVED | Noted: 2022-12-13 | Resolved: 2023-01-27

## 2023-01-27 PROBLEM — U07.1 COVID-19 AFFECTING PREGNANCY IN THIRD TRIMESTER: Status: RESOLVED | Noted: 2022-10-29 | Resolved: 2023-01-27

## 2023-01-27 PROBLEM — O99.210 OBESITY IN PREGNANCY: Status: RESOLVED | Noted: 2022-06-20 | Resolved: 2023-01-27

## 2023-01-27 PROBLEM — O99.210 OBESITY IN PREGNANCY (HCC): Status: RESOLVED | Noted: 2022-06-20 | Resolved: 2023-01-27

## 2023-01-27 PROBLEM — O98.513 COVID-19 AFFECTING PREGNANCY IN THIRD TRIMESTER: Status: RESOLVED | Noted: 2022-10-29 | Resolved: 2023-01-27

## 2023-01-27 PROBLEM — Z34.90 PREGNANCY: Status: RESOLVED | Noted: 2022-12-13 | Resolved: 2023-01-27

## 2023-01-27 PROBLEM — O98.513 COVID-19 AFFECTING PREGNANCY IN THIRD TRIMESTER (HCC): Status: RESOLVED | Noted: 2022-10-29 | Resolved: 2023-01-27

## 2023-01-27 PROBLEM — U07.1 COVID-19 AFFECTING PREGNANCY IN THIRD TRIMESTER (HCC): Status: RESOLVED | Noted: 2022-10-29 | Resolved: 2023-01-27

## 2023-01-27 PROCEDURE — 3074F SYST BP LT 130 MM HG: CPT | Performed by: OBSTETRICS & GYNECOLOGY

## 2023-01-27 PROCEDURE — 3078F DIAST BP <80 MM HG: CPT | Performed by: OBSTETRICS & GYNECOLOGY

## 2023-01-27 NOTE — TELEPHONE ENCOUNTER
Pt called and offered 1140 today with [facility]. Pt accepts appt since she is still in the building.  notified and appt moved.

## 2023-01-27 NOTE — TELEPHONE ENCOUNTER
Dr. Mesha Capellan 9:50a arrived at 10:50a thinking her appointment was 10:50a - requesting another PP appointment with dr SCHNEIDER.

## 2023-05-01 ENCOUNTER — TELEPHONE (OUTPATIENT)
Dept: FAMILY MEDICINE CLINIC | Facility: CLINIC | Age: 33
End: 2023-05-01

## 2023-05-01 NOTE — TELEPHONE ENCOUNTER
Patient booked an appt via [facility] for th following:    Newly postpartum. Experiencing anxiety.  OB office suggested finding a PC

## 2023-05-01 NOTE — TELEPHONE ENCOUNTER
Spoke with pt who states had a baby 4 months ago and also has a stressful job. Pt has intermittent anxiety with trouble focusing. Pt denies suicidal thoughts. Tried to find sooner OV, no open slots before 5/15/23 OV since pt needs to schedule as new, not established pt. Pt will go to ER if symptoms worsen.

## 2023-05-15 ENCOUNTER — LAB ENCOUNTER (OUTPATIENT)
Dept: LAB | Age: 33
End: 2023-05-15
Attending: FAMILY MEDICINE
Payer: COMMERCIAL

## 2023-05-15 DIAGNOSIS — F41.8 POSTPARTUM ANXIETY: ICD-10-CM

## 2023-05-15 PROBLEM — F41.0 PANIC ATTACKS: Status: ACTIVE | Noted: 2023-05-15

## 2023-05-15 LAB
ALBUMIN SERPL-MCNC: 4.1 G/DL (ref 3.4–5)
ALBUMIN/GLOB SERPL: 1.2 {RATIO} (ref 1–2)
ALP LIVER SERPL-CCNC: 69 U/L
ALT SERPL-CCNC: 29 U/L
ANION GAP SERPL CALC-SCNC: 4 MMOL/L (ref 0–18)
AST SERPL-CCNC: 19 U/L (ref 15–37)
BASOPHILS # BLD AUTO: 0.05 X10(3) UL (ref 0–0.2)
BASOPHILS NFR BLD AUTO: 0.8 %
BILIRUB SERPL-MCNC: 0.6 MG/DL (ref 0.1–2)
BUN BLD-MCNC: 11 MG/DL (ref 7–18)
BUN/CREAT SERPL: 15.1 (ref 10–20)
CALCIUM BLD-MCNC: 9.1 MG/DL (ref 8.5–10.1)
CHLORIDE SERPL-SCNC: 111 MMOL/L (ref 98–112)
CO2 SERPL-SCNC: 24 MMOL/L (ref 21–32)
CREAT BLD-MCNC: 0.73 MG/DL
DEPRECATED RDW RBC AUTO: 43.6 FL (ref 35.1–46.3)
EOSINOPHIL # BLD AUTO: 0.11 X10(3) UL (ref 0–0.7)
EOSINOPHIL NFR BLD AUTO: 1.9 %
ERYTHROCYTE [DISTWIDTH] IN BLOOD BY AUTOMATED COUNT: 13.3 % (ref 11–15)
FASTING STATUS PATIENT QL REPORTED: YES
GFR SERPLBLD BASED ON 1.73 SQ M-ARVRAT: 112 ML/MIN/1.73M2 (ref 60–?)
GLOBULIN PLAS-MCNC: 3.4 G/DL (ref 2.8–4.4)
GLUCOSE BLD-MCNC: 98 MG/DL (ref 70–99)
HCT VFR BLD AUTO: 42.1 %
HGB BLD-MCNC: 14.1 G/DL
IMM GRANULOCYTES # BLD AUTO: 0.01 X10(3) UL (ref 0–1)
IMM GRANULOCYTES NFR BLD: 0.2 %
LYMPHOCYTES # BLD AUTO: 2.25 X10(3) UL (ref 1–4)
LYMPHOCYTES NFR BLD AUTO: 38.2 %
MCH RBC QN AUTO: 30.2 PG (ref 26–34)
MCHC RBC AUTO-ENTMCNC: 33.5 G/DL (ref 31–37)
MCV RBC AUTO: 90.1 FL
MONOCYTES # BLD AUTO: 0.45 X10(3) UL (ref 0.1–1)
MONOCYTES NFR BLD AUTO: 7.6 %
NEUTROPHILS # BLD AUTO: 3.02 X10 (3) UL (ref 1.5–7.7)
NEUTROPHILS # BLD AUTO: 3.02 X10(3) UL (ref 1.5–7.7)
NEUTROPHILS NFR BLD AUTO: 51.3 %
OSMOLALITY SERPL CALC.SUM OF ELEC: 287 MOSM/KG (ref 275–295)
PLATELET # BLD AUTO: 219 10(3)UL (ref 150–450)
POTASSIUM SERPL-SCNC: 4.1 MMOL/L (ref 3.5–5.1)
PROT SERPL-MCNC: 7.5 G/DL (ref 6.4–8.2)
RBC # BLD AUTO: 4.67 X10(6)UL
SODIUM SERPL-SCNC: 139 MMOL/L (ref 136–145)
TSI SER-ACNC: 1.48 MIU/ML (ref 0.36–3.74)
WBC # BLD AUTO: 5.9 X10(3) UL (ref 4–11)

## 2023-05-15 PROCEDURE — 36415 COLL VENOUS BLD VENIPUNCTURE: CPT

## 2023-05-15 PROCEDURE — 85025 COMPLETE CBC W/AUTO DIFF WBC: CPT

## 2023-05-15 PROCEDURE — 80053 COMPREHEN METABOLIC PANEL: CPT

## 2023-05-15 PROCEDURE — 84443 ASSAY THYROID STIM HORMONE: CPT

## 2023-05-24 ENCOUNTER — HOSPITAL ENCOUNTER (OUTPATIENT)
Dept: ULTRASOUND IMAGING | Facility: HOSPITAL | Age: 33
Discharge: HOME | End: 2023-05-24
Attending: OBSTETRICS & GYNECOLOGY
Payer: COMMERCIAL

## 2023-05-24 DIAGNOSIS — D25.1 INTRAMURAL UTERINE FIBROID: ICD-10-CM

## 2023-05-24 PROCEDURE — 76856 US EXAM PELVIC COMPLETE: CPT | Performed by: OBSTETRICS & GYNECOLOGY

## 2023-05-24 PROCEDURE — 76830 TRANSVAGINAL US NON-OB: CPT | Performed by: OBSTETRICS & GYNECOLOGY

## 2023-11-15 NOTE — TELEPHONE ENCOUNTER
C/O STILL HAVING LEFT FLANK PAIN ALTHOUGH NOT AS BAD AS SUN WHEN SEEN IN ER.  WAS TOLD SHE DOES NOT HAVE A KIDNEY INFECTION OR KIDNEY STONES. ADVISED TO BE SEEN AND OFFERED AN APPT ON TUES WHICH SHE ACCEPTED.   ADVISED IF PAIN INCREASES AGAIN OR DEVELOPS T
PER PT STATE SHE WAS SEEN IN THE ER ON 03/04/18 WITH AN UTI / PT WANT TO KNOW IF SHE NEED TO SCHEDULE AN APPT FOR AN F/U / PT STATE SHE STILL HAVING PAIN / PLS ADV
English

## 2024-07-23 ENCOUNTER — APPOINTMENT (OUTPATIENT)
Dept: CT IMAGING | Age: 34
End: 2024-07-23
Attending: EMERGENCY MEDICINE
Payer: COMMERCIAL

## 2024-07-23 ENCOUNTER — HOSPITAL ENCOUNTER (OUTPATIENT)
Age: 34
Discharge: HOME | End: 2024-07-23
Attending: EMERGENCY MEDICINE
Payer: COMMERCIAL

## 2024-07-23 VITALS
TEMPERATURE: 98 F | OXYGEN SATURATION: 100 % | SYSTOLIC BLOOD PRESSURE: 131 MMHG | RESPIRATION RATE: 18 BRPM | DIASTOLIC BLOOD PRESSURE: 88 MMHG | HEART RATE: 108 BPM

## 2024-07-23 DIAGNOSIS — R51.9 ACUTE NONINTRACTABLE HEADACHE, UNSPECIFIED HEADACHE TYPE: ICD-10-CM

## 2024-07-23 DIAGNOSIS — S13.9XXD NECK SPRAIN, SUBSEQUENT ENCOUNTER: ICD-10-CM
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
## 2024-07-23 LAB
B-HCG UR QL: NEGATIVE
BUN BLD-MCNC: 8 MG/DL (ref 7–18)
CHLORIDE BLD-SCNC: 104 MMOL/L (ref 98–112)
CO2 BLD-SCNC: 21 MMOL/L (ref 21–32)
CREAT BLD-MCNC: 0.6 MG/DL
EGFRCR SERPLBLD CKD-EPI 2021: 121 ML/MIN/1.73M2 (ref 60–?)
GLUCOSE BLD-MCNC: 107 MG/DL (ref 70–99)
HCT VFR BLD CALC: 49 %
ISTAT IONIZED CALCIUM FOR CHEM 8: 1.16 MMOL/L (ref 1.12–1.32)
POTASSIUM BLD-SCNC: 4.6 MMOL/L (ref 3.6–5.1)
SODIUM BLD-SCNC: 138 MMOL/L (ref 136–145)

## 2024-07-23 PROCEDURE — 36415 COLL VENOUS BLD VENIPUNCTURE: CPT

## 2024-07-23 PROCEDURE — 99214 OFFICE O/P EST MOD 30 MIN: CPT

## 2024-07-23 PROCEDURE — 70498 CT ANGIOGRAPHY NECK: CPT | Performed by: EMERGENCY MEDICINE

## 2024-07-23 PROCEDURE — 80047 BASIC METABLC PNL IONIZED CA: CPT

## 2024-07-23 PROCEDURE — 70496 CT ANGIOGRAPHY HEAD: CPT | Performed by: EMERGENCY MEDICINE

## 2024-07-23 PROCEDURE — 81025 URINE PREGNANCY TEST: CPT

## 2024-07-23 PROCEDURE — 70486 CT MAXILLOFACIAL W/O DYE: CPT | Performed by: EMERGENCY MEDICINE

## 2024-07-23 PROCEDURE — 99204 OFFICE O/P NEW MOD 45 MIN: CPT

## 2024-07-23 RX ORDER — HYDROCODONE BITARTRATE AND ACETAMINOPHEN 5; 325 MG/1; MG/1
1 TABLET ORAL EVERY 4 HOURS PRN
Qty: 10 TABLET | Refills: 0 | Status: SHIPPED | OUTPATIENT
Start: 2024-07-23
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
## 2024-07-23 NOTE — DISCHARGE INSTRUCTIONS
Thank you for visiting our immediate care for your health care needs.  Please follow up with your regular doctor in the next 1-2 days.  If you have any additional problems please return to the immediate care.  Take Norco as prescribed. Please do not drink alcohol, drive, or operate heavy machinery on Norco. It can also make you constipated. You can take Colace over-the-counter to help prevent that.   Did not take additional acetaminophen with Norco. It is OK to take ibuprofen with norco.

## 2024-07-30 ENCOUNTER — NURSE TRIAGE (OUTPATIENT)
Dept: FAMILY MEDICINE CLINIC | Facility: CLINIC | Age: 34
End: 2024-07-30

## 2024-07-30 ENCOUNTER — OFFICE VISIT (OUTPATIENT)
Facility: LOCATION | Age: 34
End: 2024-07-30

## 2024-07-30 VITALS
HEIGHT: 63 IN | DIASTOLIC BLOOD PRESSURE: 84 MMHG | BODY MASS INDEX: 33.66 KG/M2 | WEIGHT: 190 LBS | HEART RATE: 88 BPM | OXYGEN SATURATION: 98 % | SYSTOLIC BLOOD PRESSURE: 116 MMHG
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
## 2024-07-30 DIAGNOSIS — G89.11 ACUTE LOW BACK PAIN DUE TO TRAUMA: ICD-10-CM

## 2024-07-30 DIAGNOSIS — M54.50 ACUTE LOW BACK PAIN DUE TO TRAUMA: ICD-10-CM

## 2024-07-30 DIAGNOSIS — S06.0X0A CONCUSSION WITHOUT LOSS OF CONSCIOUSNESS, INITIAL ENCOUNTER: ICD-10-CM

## 2024-07-30 DIAGNOSIS — H53.8 BLURRY VISION, BILATERAL: ICD-10-CM

## 2024-07-30 DIAGNOSIS — M62.830 MUSCLE SPASM OF BACK: ICD-10-CM

## 2024-07-30 PROCEDURE — 99204 OFFICE O/P NEW MOD 45 MIN: CPT | Performed by: INTERNAL MEDICINE

## 2024-07-30 PROCEDURE — 3074F SYST BP LT 130 MM HG: CPT | Performed by: INTERNAL MEDICINE

## 2024-07-30 PROCEDURE — 3079F DIAST BP 80-89 MM HG: CPT | Performed by: INTERNAL MEDICINE

## 2024-07-30 PROCEDURE — 3008F BODY MASS INDEX DOCD: CPT | Performed by: INTERNAL MEDICINE
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
## 2024-08-12 ENCOUNTER — TELEPHONE (OUTPATIENT)
Dept: PHYSICAL THERAPY | Facility: HOSPITAL | Age: 34
End: 2024-08-12

## 2024-08-13 ENCOUNTER — TELEPHONE (OUTPATIENT)
Dept: PHYSICAL THERAPY | Facility: HOSPITAL | Age: 34
End: 2024-08-13

## 2024-08-14 ENCOUNTER — OFFICE VISIT (OUTPATIENT)
Dept: PHYSICAL THERAPY | Age: 34
End: 2024-08-14
Attending: INTERNAL MEDICINE
Payer: COMMERCIAL
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
## 2024-08-14 DIAGNOSIS — G89.11 ACUTE LOW BACK PAIN DUE TO TRAUMA: ICD-10-CM

## 2024-08-14 DIAGNOSIS — M54.50 ACUTE LOW BACK PAIN DUE TO TRAUMA: ICD-10-CM

## 2024-08-14 DIAGNOSIS — S06.0X0A CONCUSSION WITHOUT LOSS OF CONSCIOUSNESS, INITIAL ENCOUNTER: ICD-10-CM

## 2024-08-14 DIAGNOSIS — M62.830 MUSCLE SPASM OF BACK: ICD-10-CM

## 2024-08-14 PROCEDURE — 97110 THERAPEUTIC EXERCISES: CPT

## 2024-08-14 PROCEDURE — 97162 PT EVAL MOD COMPLEX 30 MIN: CPT
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
## 2024-08-21 ENCOUNTER — OFFICE VISIT (OUTPATIENT)
Dept: PHYSICAL THERAPY | Age: 34
End: 2024-08-21
Attending: INTERNAL MEDICINE
Payer: COMMERCIAL

## 2024-08-21 PROCEDURE — 97140 MANUAL THERAPY 1/> REGIONS: CPT

## 2024-08-21 PROCEDURE — 97110 THERAPEUTIC EXERCISES: CPT
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
## 2024-08-23 ENCOUNTER — OFFICE VISIT (OUTPATIENT)
Dept: PHYSICAL THERAPY | Age: 34
End: 2024-08-23
Attending: INTERNAL MEDICINE
Payer: COMMERCIAL

## 2024-08-23 PROCEDURE — 97140 MANUAL THERAPY 1/> REGIONS: CPT

## 2024-08-23 PROCEDURE — 97110 THERAPEUTIC EXERCISES: CPT
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
## 2024-08-28 ENCOUNTER — OFFICE VISIT (OUTPATIENT)
Dept: PHYSICAL THERAPY | Age: 34
End: 2024-08-28
Attending: INTERNAL MEDICINE
Payer: COMMERCIAL

## 2024-08-28 PROCEDURE — 97140 MANUAL THERAPY 1/> REGIONS: CPT

## 2024-08-28 PROCEDURE — 97110 THERAPEUTIC EXERCISES: CPT
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
## 2024-08-30 ENCOUNTER — OFFICE VISIT (OUTPATIENT)
Dept: PHYSICAL THERAPY | Age: 34
End: 2024-08-30
Attending: INTERNAL MEDICINE
Payer: COMMERCIAL

## 2024-08-30 PROCEDURE — 97110 THERAPEUTIC EXERCISES: CPT

## 2024-08-30 PROCEDURE — 97140 MANUAL THERAPY 1/> REGIONS: CPT
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
## 2024-09-04 ENCOUNTER — OFFICE VISIT (OUTPATIENT)
Dept: PHYSICAL THERAPY | Age: 34
End: 2024-09-04
Attending: INTERNAL MEDICINE
Payer: COMMERCIAL

## 2024-09-04 PROCEDURE — 97140 MANUAL THERAPY 1/> REGIONS: CPT

## 2024-09-04 PROCEDURE — 97110 THERAPEUTIC EXERCISES: CPT
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
## 2024-09-06 ENCOUNTER — OFFICE VISIT (OUTPATIENT)
Dept: PHYSICAL THERAPY | Age: 34
End: 2024-09-06
Attending: INTERNAL MEDICINE
Payer: COMMERCIAL

## 2024-09-06 PROCEDURE — 97110 THERAPEUTIC EXERCISES: CPT

## 2024-09-06 PROCEDURE — 97140 MANUAL THERAPY 1/> REGIONS: CPT
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
## 2024-09-11 ENCOUNTER — OFFICE VISIT (OUTPATIENT)
Dept: PHYSICAL THERAPY | Age: 34
End: 2024-09-11
Attending: INTERNAL MEDICINE
Payer: COMMERCIAL

## 2024-09-11 ENCOUNTER — LAB ENCOUNTER (OUTPATIENT)
Dept: LAB | Facility: REFERENCE LAB | Age: 34
End: 2024-09-11
Attending: INTERNAL MEDICINE
Payer: COMMERCIAL

## 2024-09-11 ENCOUNTER — OFFICE VISIT (OUTPATIENT)
Facility: LOCATION | Age: 34
End: 2024-09-11

## 2024-09-11 VITALS
OXYGEN SATURATION: 99 % | HEIGHT: 63 IN | WEIGHT: 190 LBS | DIASTOLIC BLOOD PRESSURE: 78 MMHG | HEART RATE: 93 BPM | SYSTOLIC BLOOD PRESSURE: 115 MMHG | BODY MASS INDEX: 33.66 KG/M2

## 2024-09-11 DIAGNOSIS — Z13.21 SCREENING FOR ENDOCRINE, NUTRITIONAL, METABOLIC AND IMMUNITY DISORDER: ICD-10-CM

## 2024-09-11 DIAGNOSIS — Z13.0 SCREENING FOR ENDOCRINE, NUTRITIONAL, METABOLIC AND IMMUNITY DISORDER: ICD-10-CM

## 2024-09-11 DIAGNOSIS — R10.11 RUQ PAIN: ICD-10-CM

## 2024-09-11 DIAGNOSIS — Z13.228 SCREENING FOR ENDOCRINE, NUTRITIONAL, METABOLIC AND IMMUNITY DISORDER: ICD-10-CM

## 2024-09-11 DIAGNOSIS — Z13.29 SCREENING FOR ENDOCRINE, NUTRITIONAL, METABOLIC AND IMMUNITY DISORDER: ICD-10-CM

## 2024-09-11 DIAGNOSIS — Z00.00 ANNUAL PHYSICAL EXAM: Primary | ICD-10-CM

## 2024-09-11 DIAGNOSIS — E55.9 VITAMIN D DEFICIENCY: ICD-10-CM

## 2024-09-11 LAB
ALBUMIN SERPL-MCNC: 4.9 G/DL (ref 3.2–4.8)
ALBUMIN/GLOB SERPL: 1.8 {RATIO} (ref 1–2)
ALP LIVER SERPL-CCNC: 78 U/L
ALT SERPL-CCNC: 32 U/L
ANION GAP SERPL CALC-SCNC: 8 MMOL/L (ref 0–18)
AST SERPL-CCNC: 22 U/L (ref ?–34)
BILIRUB SERPL-MCNC: 0.6 MG/DL (ref 0.3–1.2)
BUN BLD-MCNC: 10 MG/DL (ref 9–23)
BUN/CREAT SERPL: 13.9 (ref 10–20)
CALCIUM BLD-MCNC: 9.4 MG/DL (ref 8.7–10.4)
CHLORIDE SERPL-SCNC: 109 MMOL/L (ref 98–112)
CHOLEST SERPL-MCNC: 223 MG/DL (ref ?–200)
CO2 SERPL-SCNC: 23 MMOL/L (ref 21–32)
CREAT BLD-MCNC: 0.72 MG/DL
DEPRECATED RDW RBC AUTO: 41.5 FL (ref 35.1–46.3)
EGFRCR SERPLBLD CKD-EPI 2021: 113 ML/MIN/1.73M2 (ref 60–?)
ERYTHROCYTE [DISTWIDTH] IN BLOOD BY AUTOMATED COUNT: 12.7 % (ref 11–15)
EST. AVERAGE GLUCOSE BLD GHB EST-MCNC: 105 MG/DL (ref 68–126)
FASTING PATIENT LIPID ANSWER: NO
FASTING STATUS PATIENT QL REPORTED: NO
GLOBULIN PLAS-MCNC: 2.7 G/DL (ref 2–3.5)
GLUCOSE BLD-MCNC: 87 MG/DL (ref 70–99)
HBA1C MFR BLD: 5.3 % (ref ?–5.7)
HCT VFR BLD AUTO: 41.7 %
HDLC SERPL-MCNC: 67 MG/DL (ref 40–59)
HGB BLD-MCNC: 14.4 G/DL
LDLC SERPL CALC-MCNC: 130 MG/DL (ref ?–100)
MCH RBC QN AUTO: 30.8 PG (ref 26–34)
MCHC RBC AUTO-ENTMCNC: 34.5 G/DL (ref 31–37)
MCV RBC AUTO: 89.1 FL
NONHDLC SERPL-MCNC: 156 MG/DL (ref ?–130)
OSMOLALITY SERPL CALC.SUM OF ELEC: 288 MOSM/KG (ref 275–295)
PLATELET # BLD AUTO: 200 10(3)UL (ref 150–450)
POTASSIUM SERPL-SCNC: 4.5 MMOL/L (ref 3.5–5.1)
PROT SERPL-MCNC: 7.6 G/DL (ref 5.7–8.2)
RBC # BLD AUTO: 4.68 X10(6)UL
SODIUM SERPL-SCNC: 140 MMOL/L (ref 136–145)
TRIGL SERPL-MCNC: 151 MG/DL (ref 30–149)
TSI SER-ACNC: 1.01 MIU/ML (ref 0.55–4.78)
VIT D+METAB SERPL-MCNC: 13.9 NG/ML (ref 30–100)
VLDLC SERPL CALC-MCNC: 27 MG/DL (ref 0–30)
WBC # BLD AUTO: 7 X10(3) UL (ref 4–11)

## 2024-09-11 PROCEDURE — 3008F BODY MASS INDEX DOCD: CPT | Performed by: INTERNAL MEDICINE

## 2024-09-11 PROCEDURE — 97110 THERAPEUTIC EXERCISES: CPT

## 2024-09-11 PROCEDURE — 84443 ASSAY THYROID STIM HORMONE: CPT | Performed by: INTERNAL MEDICINE

## 2024-09-11 PROCEDURE — 97140 MANUAL THERAPY 1/> REGIONS: CPT

## 2024-09-11 PROCEDURE — 3078F DIAST BP <80 MM HG: CPT | Performed by: INTERNAL MEDICINE

## 2024-09-11 PROCEDURE — 3074F SYST BP LT 130 MM HG: CPT | Performed by: INTERNAL MEDICINE

## 2024-09-11 PROCEDURE — 80061 LIPID PANEL: CPT | Performed by: INTERNAL MEDICINE

## 2024-09-11 PROCEDURE — 82306 VITAMIN D 25 HYDROXY: CPT | Performed by: INTERNAL MEDICINE

## 2024-09-11 PROCEDURE — 83036 HEMOGLOBIN GLYCOSYLATED A1C: CPT | Performed by: INTERNAL MEDICINE

## 2024-09-11 PROCEDURE — 80053 COMPREHEN METABOLIC PANEL: CPT | Performed by: INTERNAL MEDICINE

## 2024-09-11 PROCEDURE — 85027 COMPLETE CBC AUTOMATED: CPT | Performed by: INTERNAL MEDICINE

## 2024-09-11 PROCEDURE — 99395 PREV VISIT EST AGE 18-39: CPT | Performed by: INTERNAL MEDICINE

## 2024-09-11 PROCEDURE — 36415 COLL VENOUS BLD VENIPUNCTURE: CPT | Performed by: INTERNAL MEDICINE

## 2024-09-11 NOTE — PROGRESS NOTES
INTERNAL MEDICINE ANNUAL EXAM NOTE     Patient ID: Lillian Alvarado is a 34 year old female.  Chief Complaint: Follow - Up (Patient states getting better but still having headache and back ache but getting better)      Lillian Alvarado is a pleasant 34 year old female who presents for annual physical exam. Lillian Alvarado is doing well today.  Having intermittent RUQ Pain , worse after eating. +strong family history. +neck pains but improving.     Health Maintenance  - All care gaps addressed with patient.     Review of Systems  Review of Systems   Constitutional:  Negative for unexpected weight change.   HENT:  Negative for hearing loss.    Eyes:  Negative for pain and visual disturbance.   Respiratory:  Negative for shortness of breath.    Cardiovascular:  Negative for chest pain, palpitations and leg swelling.   Gastrointestinal:  Negative for abdominal pain and blood in stool.   Genitourinary:  Negative for difficulty urinating and hematuria.   Neurological:  Negative for tremors and syncope.   Psychiatric/Behavioral: Negative.         Physical Exam  Vitals:    09/11/24 0952   BP: 115/78   Pulse: 93   SpO2: 99%   Weight: 190 lb (86.2 kg)   Height: 5' 3\" (1.6 m)     Body mass index is 33.66 kg/m².  BP Readings from Last 3 Encounters:   09/11/24 115/78   07/30/24 116/84   07/23/24 131/88     Physical Exam  Vitals and nursing note reviewed.   Constitutional:       General: She is not in acute distress.     Appearance: Normal appearance.   HENT:      Head: Normocephalic.      Right Ear: External ear normal.      Left Ear: External ear normal.   Eyes:      Extraocular Movements: Extraocular movements intact.      Conjunctiva/sclera: Conjunctivae normal.   Cardiovascular:      Rate and Rhythm: Normal rate and regular rhythm.      Pulses: Normal pulses.      Heart sounds: Normal heart sounds.   Pulmonary:      Effort: Pulmonary effort is normal. No respiratory distress.       Breath sounds: Normal breath sounds. No wheezing.   Abdominal:      General: Abdomen is flat. Bowel sounds are normal.      Tenderness: There is no abdominal tenderness.   Musculoskeletal:         General: Normal range of motion.      Cervical back: Normal range of motion and neck supple.   Skin:     Coloration: Skin is not jaundiced.   Neurological:      General: No focal deficit present.      Mental Status: She is alert and oriented to person, place, and time. Mental status is at baseline.   Psychiatric:         Mood and Affect: Mood normal.         Behavior: Behavior normal.           Labs & Imaging  Pertinent labs and imaging reviewed.   Lab Results   Component Value Date    GLU 98 05/15/2023    BUN 11 05/15/2023    BUNCREA 15.1 05/15/2023    CREATSERUM 0.73 05/15/2023    ANIONGAP 4 05/15/2023    GFRNAA >60 03/04/2018    GFRAA >60 03/04/2018    CA 9.1 05/15/2023    OSMOCALC 287 05/15/2023    ALKPHO 69 05/15/2023    AST 19 05/15/2023    ALT 29 05/15/2023    BILT 0.6 05/15/2023    TP 7.5 05/15/2023    ALB 4.1 05/15/2023    GLOBULIN 3.4 05/15/2023     05/15/2023    K 4.1 05/15/2023     05/15/2023    CO2 24.0 05/15/2023     No results found for: \"EAG\", \"A1C\"  Lab Results   Component Value Date    WBC 5.9 05/15/2023    RBC 4.67 05/15/2023    HGB 14.1 05/15/2023    HCT 42.1 05/15/2023    MCV 90.1 05/15/2023    MCH 30.2 05/15/2023    MCHC 33.5 05/15/2023    RDW 13.3 05/15/2023    .0 05/15/2023    MPV 9.8 03/04/2018     No results found for: \"CHOLEST\", \"TRIG\", \"HDL\", \"LDL\", \"VLDL\", \"TCHDLRATIO\", \"NONHDLC\", \"CHOLHDLRATIO\", \"CALCNONHDL\"  The ASCVD Risk score (Ronnie BARAHONA, et al., 2019) failed to calculate for the following reasons:    The 2019 ASCVD risk score is only valid for ages 40 to 79    Medical History    Reviewed allergies:  Allergies   Allergen Reactions    Sulfanilamide UNKNOWN     Had topically, got a \"sunburn\" reaction        Reviewed:  Patient Active Problem List    Diagnosis    Panic  attacks    Postpartum anxiety (HCC)    Intramural uterine fibroid    Postpartum care and examination of lactating mother ([facility])    Severe needle phobia     10-17-22  Discussed TDAP and she wants it but still needs to have spouse with her for visit.       Fibroid     Fibroid 7.9x5.5x5.0 cm pedunculated fundal.on  8/8/2022      Fibroid seen on first trimester screen- 62mmx 42mm x 64mm  Right lateral pedunculated        Genital herpes     Plan Valtrex suppression at 37.         Reviewed:  Past Medical History:    Allergy    allergies, on zyrtec    Asthma (HCC)    Genital herpes      Reviewed:  Family History   Problem Relation Age of Onset    Diabetes Paternal Grandfather         Type 2    Allergies Other         family h/o    Melanoma Mother     Heart Disease Other         coronary artery disease, family h/o       Reviewed:  Past Surgical History:   Procedure Laterality Date    Hpv (gardasil)      Gardasil series completed    Tonsillectomy  2010      Reviewed:  Social History     Socioeconomic History    Marital status:    Occupational History    Occupation: [email or web address]     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          Assessment & Plan    1. Annual physical exam  - Comp Metabolic Panel (14)  - Hemoglobin A1C  - CBC, Platelet; No Differential  - Lipid Panel  - TSH W Reflex To Free T4    2. Screening for endocrine, nutritional, metabolic and immunity  disorder  - Comp Metabolic Panel (14)  - Hemoglobin A1C  - CBC, Platelet; No Differential  - Lipid Panel  - TSH W Reflex To Free T4  - Vitamin D    3. Vitamin D deficiency  - Vitamin D    4. RUQ pain  - US ABDOMEN LIMITED (CPT=76705); Future  Plan  Overall doing well today. Screening labs, preventive imaging/procedure, and health care gaps addressed as per USPSTF guidelines, orders available electronically via [employer] and in [facility].  Vaccines discussed and administered depending on availability and per patient preference; patient to return for any outstanding vaccines once available.  Patient brought to  to help schedule care gaps and follow up. Further recommendations depending on lab results.          Follow Up:   Return for 1 YEAR FOR ANNUAL OR DEPENDING ON LAB RESULTS.      Morris Corrales MD  Internal Medicine      Patient asked to sign release of information for outside records if not already requested, make future office/imaging appointments at the  prior to leaving, and to sign up for [facility] if not already active.  Preventive measures and further education discussed with patient as per after visit summary. Potential medication side effects discussed. All questions answered to best of ability.   Call office with any questions. Seek emergency care if necessary.   Patient understands and agrees to follow directions and advice.      ----------------------------------------- PATIENT INSTRUCTIONS-----------------------------------------     Patient Instructions   1.  Start taking 1000 mg of Tylenol every 8 hours to help with pain you may use this as needed  2.  Start taking 2 Tums twice a day in the morning and before bed to help soak up some of the acid, unfortunately ibuprofen and stress can cause a lot of reflux and that sounds like what is occurring with your stomach.  Acid reflux even without major symptoms can cause throat congestion and headaches therefore you may not actually feel any  reflux symptoms but you can feel other symptoms in the upper airway.  Tums have minimal to no side effects of after a week or so if you notice no improvement you may stop.

## 2024-09-11 NOTE — PATIENT INSTRUCTIONS
1.  Start taking 1000 mg of Tylenol every 8 hours to help with pain you may use this as needed  2.  Start taking 2 Tums twice a day in the morning and before bed to help soak up some of the acid, unfortunately ibuprofen and stress can cause a lot of reflux and that sounds like what is occurring with your stomach.  Acid reflux even without major symptoms can cause throat congestion and headaches therefore you may not actually feel any reflux symptoms but you can feel other symptoms in the upper airway.  Tums have minimal to no side effects of after a week or so if you notice no improvement you may stop.
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
## 2024-09-20 ENCOUNTER — TELEPHONE (OUTPATIENT)
Dept: PHYSICAL THERAPY | Facility: HOSPITAL | Age: 34
End: 2024-09-20
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
## 2024-10-15 NOTE — TELEPHONE ENCOUNTER
Jemma Mcqueen called in to make a missed menses appointment.   Last period 03/19/2022
Pt reports +HPT and LMP of 3/19. Pt reports monthly cycles every 28 days. Pt is on pnv with dha. Pt informed we have male and female providers and she will see all of them throughout care. Pt informed her first appt is with the RN and pt scheduled OBN for 5/10. Pt advised she will need to provide urine sample when she arrives.
None

## 2024-11-03 NOTE — ED INITIAL ASSESSMENT (HPI)
Patient with right ear pain x 3 days.  States the discomfort keeps her up at night.  Took sudafed without relief in symptoms.

## 2024-11-03 NOTE — ED PROVIDER NOTES
Patient Seen in: [facility]      History     Chief Complaint   Patient presents with    Ear Problem Pain     Stated Complaint: ear complaint    Subjective:   HPI      The patient is a 34-year-old female with no significant past medical history who presents now with left greater than right ear pain.  The patient states the symptoms started on Friday.  Patient denies any significant nasal congestion.  Patient describes some discomfort associated with a muffled sensation particularly in the right ear.  Patient denies any fever    Objective:     Past Medical History:    Allergy    allergies, on zyrtec    Asthma (HCC)    Genital herpes              Past Surgical History:   Procedure Laterality Date    Hpv (gardasil)      Gardasil series completed    Tonsillectomy  2010                No pertinent social history.            Review of Systems    Positive for stated complaint: ear complaint  Other systems are as noted in HPI.  Constitutional and vital signs reviewed.      All other systems reviewed and negative except as noted above.    Physical Exam     ED Triage Vitals [11/03/24 0904]   /62   Pulse 88   Resp 16   Temp 97.8 °F (36.6 °C)   Temp src Temporal   SpO2 97 %   O2 Device None (Room air)       Current Vitals:   Vital Signs  BP: 111/62  Pulse: 88  Resp: 16  Temp: 97.8 °F (36.6 °C)  Temp src: Temporal    Oxygen Therapy  SpO2: 97 %  O2 Device: None (Room air)        Physical Exam    Constitutional: Well-developed well-nourished in no acute distress  Head: Normocephalic, no swelling or tenderness  Eyes: Nonicteric sclera, no conjunctival injection  ENT: Scant wax bilaterally.  The external auditory canals appear normal.  There is mild erythema and bulging to the left TM.  There is moderate erythema and bulging to the right TM there is no posterior pharyngeal erythema  Chest: Clear to auscultation, no tenderness  Cardiovascular: Regular rate and rhythm without murmur  Abdomen: Soft, nontender and  nondistended  Neurologic: Patient is awake, alert and oriented ×3.  The patient's motor strength is 5 out of 5 and symmetric in the upper and lower extremities bilaterally  Extremities: No focal swelling or tenderness  Skin: No pallor, no redness or warmth to the touch      ED Course   Labs Reviewed - No data to display         Will initiate p.o. Augmentin.       MDM      Otitis media versus otitis externa versus cerumen impaction        Medical Decision Making      Disposition and Plan     Clinical Impression:  1. Right otitis media with effusion         Disposition:  Discharge  11/3/2024  9:09 am    Follow-up:  Morris Corrales MD  [address] [facility] [address]  [phone]    In 1 week  If symptoms worsen          Medications Prescribed:  Current Discharge Medication List        START taking these medications    Details   amoxicillin clavulanate 875-125 MG Oral Tab Take 1 tablet by mouth 2 (two) times daily for 7 days.  Qty: 14 tablet, Refills: 0                 Supplementary Documentation:

## 2024-12-05 NOTE — ED PROVIDER NOTES
Patient Seen in: [facility]    History     Chief Complaint   Patient presents with    Sore Throat    Cough/URI    Nasal Congestion    Eye Problem     Stated Complaint: Cough; Eye Problem    HPI    Lillian Alvarado is a 34 year old female presents with chief complaint of cough.  Onset 5 days ago.  Patient reports associated sore throat and bilateral earache.  Patient states she woke this morning with right eye redness and right crusted ocular discharge.  Patient does wear contact lenses.  Patient denies fever, chills, otorrhea, nasal drainage, trismus, drooling, abdominal pain, nausea, vomiting, diarrhea, constipation, dysuria, hematuria, flank pain, rash, neck pain, neck swelling, restricted neck movement, dyspnea, wheeze, hemoptysis, injury or trauma, vision changes, diplopia, photophobia.      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  Constitutional and vital signs reviewed.      All other systems reviewed and negative except as noted above.    PSFH elements reviewed from today and agreed except as otherwise stated in HPI.    Physical Exam     ED Triage Vitals [12/05/24 0808]   /76   Pulse 110   Resp 12   Temp 98.3 °F (36.8 °C)   Temp src Oral   SpO2 97 %   O2 Device None (Room air)       Current:/76   Pulse 110   Temp 98.3 °F (36.8 °C) (Oral)   Resp 12   LMP 11/13/2024 (Exact Date)   SpO2 97%     PULSE OX within normal limits on room air as interpreted by this provider.     Constitutional: The patient is cooperative. Appears well-developed and well-nourished.  Mild discomfort.  Psychological: Alert, No abnormalities of mood, affect.  Head: Normocephalic/atraumatic.   Eyes: Pupils are equal round reactive to light.  Right conjunctiva injected.  Crusted ocular discharge present.  No eyelid swelling or erythema.  Nontender to palpation.  Extraocular motions intact bilaterally without reported pain.  No chemosis, hypopyon or hyphema.  No evidence of abrasion or ulcer under fluorescein examination.  Negative Blaine test.  Everted lids reveal no foreign body.  ENT: Pharynx injected.  Tonsils within normal size limits bilaterally.  No tonsillar exudates.  Uvula midline.  No trismus.  No drooling.  TMs and auditory canals within normal limits bilaterally.  No post auricular tenderness, adenopathy or erythema.  No otorrhea.  Mucous membranes moist.  Neck: The neck is supple.  Nontender.  No meningeal signs.  Chest: There is no tenderness to the chest wall.  No CVA tenderness bilaterally.  Respiratory: Respiratory effort was normal.  Decreased lung sounds right lung.  There is no stridor.  Air entry is equal.  Cardiovascular: Tachycardic, regular rhythm.  Capillary refill is brisk.    Lymphatic: No gross  lymphadenopathy noted.  Musculoskeletal: Musculoskeletal system is grossly intact.  There is no obvious deformity.  Neurological: Gross motor movement is intact in all 4 extremities.  Patient exhibits normal speech.  Skin: Skin is normal to inspection.  Warm and dry.  No obvious bruising.  No obvious rash.        ED Course     Labs Reviewed   RAPID STREP A - Normal     Procedure: Right eye examined under fluorescein dye.  Findings as documented above.  Patient tolerated procedure well without complication.    MDM     Differential diagnosis including but not limited to URI, bronchitis, pneumonia, strep pharyngitis, viral pharyngitis, otitis media, otitis externa, cerumen impaction, conjunctivitis    Rapid strep negative.    Radiology findings: XR CHEST PA + LAT CHEST (CPT=71046)    Result Date: 12/5/2024  CONCLUSION:  1. No acute disease in the chest.    Dictated by (CST): Marcelo King MD on 12/05/2024 at 8:37 AM     Finalized by (CST): Marcelo King MD on 12/05/2024 at 8:38 AM           Chest x-ray images independently reviewed by this provider-no pneumonia.    Physical exam remained stable over serial reexaminations as previously documented.  Results reviewed with patient.    I have given the patient instructions regarding their diagnoses, expectations, follow up, and ER precautions. I explained to the patient that emergent conditions may arise and to go to the ER for new, worsening or any persistent conditions. I've explained the importance of following up with their doctor as instructed. The patient verbalized understanding of the discharge instructions and plan.        Disposition and Plan     Clinical Impression:  1. Acute cough    2. Acute pharyngitis, unspecified etiology    3. Earache symptoms, bilateral    4. Acute conjunctivitis of right eye, unspecified acute conjunctivitis type        Disposition:  Discharge    Follow-up:  Morris Corrales MD  [address] [place] [address]  [id number]  [phone]    Call in 1 day  For follow-up      Medications Prescribed:  Discharge Medication List as of 12/5/2024  8:47 AM        START taking these medications    Details   Spacer/Aero-Holding Chambers Does not apply Device Use with albuterol inhaler, Normal, Disp-1 each, R-0      albuterol 108 (90 Base) MCG/ACT Inhalation Aero Soln Inhale 2 puffs into the lungs every 4 (four) hours as needed for Wheezing., Normal, Disp-1 each, R-0      benzonatate 100 MG Oral Cap Take 1 capsule (100 mg total) by mouth 3 (three) times daily as needed for cough., Normal, Disp-30 capsule, R-0      ibuprofen 600 MG Oral Tab Take 1 tablet (600 mg total) by mouth every 6 hours with food, Normal, Disp-20 tablet, R-0      phenol 1.4 % Mouth/Throat Liquid Use as directed 1 mL in the mouth or throat every 2 (two) hours as needed. For 5 days, Normal, Disp-177 mL, R-0      moxifloxacin 0.5 % Ophthalmic Solution Apply 1 drop to eye 3 (three) times daily for 7 days., Normal, Disp-1 each, R-0Please do not fill Polytrim.  Please fill moxifloxacin.

## 2024-12-05 NOTE — ED INITIAL ASSESSMENT (HPI)
Patient with cough, sore throat, and earaches since Saturday   This morning patient with discharge from the right eye   No recent fever

## 2025-04-11 NOTE — TELEPHONE ENCOUNTER
12/15 . Pt calling indicating she has noted a increase in perineal pain over the last 2-3 days. Pt states the pain reminds her when she first delivered. Pt states bleeding is light/moderate, states she passed a golf ball size clot yesterday evening. Pt has been bearing down to attempt to have a BM. Is worried she may have done something to the perineal area. Pt is on Colace and fiber, but states constipation has been at its worse this past weekend. Pt requesting to be seen, did offer appt with [facility] today at 250 pm since [facility] has no open slot post call. Pt accepts appt. Department of Anesthesiology  Postprocedure Note    Patient: Derrick Escobedo  MRN: [id number]  YOB: 2019  Date of evaluation: 4/11/2025    Procedure Summary       Date: 04/10/25 Room / Location: [address]    Anesthesia Start: 0726 Anesthesia Stop: 0840    Procedure: INTRACAPSULAR TONSILLECTOMY ADENOIDECTOMY *OVERNIGHT STAY* Diagnosis:       Sleep-disordered breathing      Tonsillar hypertrophy      (Sleep-disordered breathing [G47.30])      (Tonsillar hypertrophy [J35.1])    Surgeons: Nkechi Hernandez MD Responsible Provider: Tono Aviles MD    Anesthesia Type: general ASA Status: 2            Anesthesia Type: No value filed.    Wkan Phase I: Kwan Score: 10    Kwan Phase II:      Anesthesia Post Evaluation    Patient location during evaluation: bedside  Patient participation: complete - patient cannot participate  Level of consciousness: awake  Airway patency: patent  Nausea & Vomiting: no nausea and no vomiting  Cardiovascular status: blood pressure returned to baseline  Respiratory status: acceptable  Hydration status: euvolemic  Comments: /88   Pulse 120   Temp 97 °F (36.1 °C) (Temporal)   Resp 23   Ht 1.12 m (3' 8.09\")   Wt 22.7 kg (50 lb 1.6 oz)   SpO2 97%   BMI 18.12 kg/m²     Pain management: adequate        No notable events documented.

## 2025-04-12 NOTE — PROGRESS NOTES
Pt seen for OBN appt today with no complaints. Normal PN labs ordered plus 1 hr gtt. Pt advised all labs must be completed and resulted prior to NPN appt. If labs are not completed and resulted the NPN appt will be cancelled. Pt informed again of both male and female providers and the need to rotate PN appt with all providers since OB on-call will be the one that delivers her. Assisted pt with scheduling NPN appt with MD.      Height: 5'3''  Weight: 190 lbs  BMI: 33.7    Partner's name is Magdy contact #[phone]; race:   Occupation: Works in [employer] sales    MEDICAL HISTORY    Anemia No    Anesthetic complications No    Anxiety/Depression  Yes Self reported anxiety, never formally diagnosed   Autoimmune Disorder No    Asthma  Yes Seasonal asthma triggered by allergies   Cancer No    Diabetes  No    Gyne/breast Surgery No    Heart Disease No    Hepatitis/Liver Disease  No    History of blood transfusion No    History of abnormal pap Yes ASCUS +HPV 2019, 2021. 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 If no, are you a Rastafari? Yes                INFECTION HISTORY    Chlamydia No    Pt or partner have hx of Genital Herpes Yes Last outbreak was 2013 or 2014, does not take any meds for suppression.   Gonorrhea No    Hepatitis B No    HIV  No    HPV No    MRSA Yes On face in 2014   Syphilis No    Tattoos No    Live with someone or Exposed to TB No    Rash or viral illness since LMP  No    Varicella No    Pets Yes 2 dogs         GENETICS SCREENING    Genetic Screening    Genetic Screening/Teratology Counseling- Includes patient, baby's father, or anyone in either family with:  Patient's age 35 years or older as of estimated date of delivery: No     Thalassemia (Italian, Greek, Mediterranean, or  background): MCV less than 80: No     Neural tube defect (Meningomyelocele, Spina bifida, or Anencephaly): No     Congenital heart defect: No     Down syndrome: No     Calderon-Sachs (Ashkenazi Gnosticism, Cajun, Khmer Liberian): No     Canavan disease (Ashkenazi Gnosticism): No     Familial dysautonomia (Ashkenazi Gnosticism): No     Sickle cell disease or trait (): No     Hemophilia or other blood disorders: No     Muscular dystrophy: No    Cystic fibrosis: No     Rockland's chorea: No     Intellectual disability and/or autism: No     Other inherited genetic or chromosomal disorder: No     Maternal metabolic disorder (eg. Type 1 diabetes, PKU): Yes (Comment: Pt's paternal grandpa with type 1 diabetes)     Patient or baby's father had child with birth defects not listed above: No     Recurrent pregnancy loss, or a stillbirth: No     Medications (including supplements, vitamins, herbs, or OTC drugs)/illicit/recreational drugs/alcohol since last menstrual period: Yes (Comment: prenatal vitamin, choline supplement, Zyrtec PRN, Tums PRN, Famotidine nightly)                 MISC    Infant vaccinations  Yes    Pt. Has answered NO 5P questions and has NO  risk factors.    Pt. Given What pregnant women need to know handout.

## 2025-04-29 NOTE — PROGRESS NOTES
Chief Complaint   Patient presents with    Problem     PN SPOTTING          HPI:      History of Present Illness  Lillian Alvarado is a 34 year old female who presents with light spotting during her second pregnancy.    She is eight weeks and four days pregnant, with her last menstrual period starting on 2025. She experiences light brown spotting today, without pain or heavy bleeding. She notes a little cramping this afternoon, which she attributes to nervousness about her glucose test and initial labs. Her menstrual cycles are typically 28 to 30 days long, with the last cycle being slightly longer before conception.    She takes prenatal vitamins, choline, vitamin D, B6, Unisom at night, and famotidine both in the morning and at night. She has been on these supplements for over a year while trying to conceive. She has no recent intimacy and reports a little brown discharge, but no significant pain or cramping outside of the earlier mentioned cramping. Early in the pregnancy, she experienced distinct ligament pain when coughing or sneezing while lying down.    MBT O+      Chaperone: declines      Depression Screening (PHQ-2/PHQ-9): Over the LAST 2 WEEKS   Little interest or pleasure in doing things (over the last two weeks)?: Not at all    Feeling down, depressed, or hopeless (over the last two weeks)?: Not at all    PHQ-2 SCORE: 0            Reviewed medical and surgical history below       OBSTETRICS HISTORY:  OB History    Para Term  AB Living   2 1 1 0 0 1   SAB IAB Ectopic Multiple Live Births   0 0 0 0 1       GYNE HISTORY:  History   Sexual Activity    Sexual activity: Yes    Partners: Male    Birth control/ protection: OCP     Comment: same partner     Menarche: 16 years old   Use of Birth Control (if yes, specify type): OCP       MEDICAL HISTORY:  Past Medical History:    Allergy    allergies, on zyrtec    Asthma (HCC)    Inhaler PRN    Genital herpes       SURGICAL  HISTORY:  Past Surgical History:   Procedure Laterality Date    Tonsillectomy  2010       SOCIAL HISTORY:  Social History     Socioeconomic History    Marital status:      Spouse name: Not on file    Number of children: Not on file    Years of education: Not on file    Highest education level: Not on file   Occupational History    Occupation: [employer]     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   Tobacco Use    Smoking status: Never    Smokeless tobacco: Never   Vaping Use    Vaping status: Never Used   Substance and Sexual Activity    Alcohol use: Not Currently     Comment: occasional prior to pregnancy    Drug use: No    Sexual activity: Yes     Partners: Male     Birth control/protection: OCP     Comment: same partner   Other Topics Concern     Service Not Asked    Blood Transfusions Not Asked    Caffeine Concern Yes     Comment: coffee, soda, 1 cup daily    Occupational Exposure Not Asked    Hobby Hazards Not Asked    Sleep Concern Not Asked    Stress Concern Not Asked    Weight Concern Not Asked    Special Diet Not Asked    Back Care Not Asked    Exercise No    Bike Helmet Not Asked    Seat Belt Not Asked    Self-Exams Not Asked   Social History Narrative    Not on file     Social Drivers of Health     Food Insecurity: Not on file   Transportation Needs: Not on file   Stress: Not on file   Housing Stability: Not on file       FAMILY HISTORY:  Family History   Problem Relation Age of Onset    Sleep apnea Father     Melanoma Mother     Tongue abnormality Daughter         Tongue and lip tie    Old age Maternal Grandmother         Passed at 96 from old age    Lung cancer (HCC) Maternal Grandfather     Atrial fibrillation (HCC) Paternal Grandmother     Diabetes Paternal Grandfather     Allergies Other         family h/o    Heart Disease Other         coronary artery disease, family h/o on paternal side       MEDICATIONS:    Current Outpatient Medications:      Spacer/Aero-Holding Chambers Does not apply Device, Use with albuterol inhaler, Disp: 1 each, Rfl: 0    phenol 1.4 % Mouth/Throat Liquid, Use as directed 1 mL in the mouth or throat every 2 (two) hours as needed. For 5 days, Disp: 177 mL, Rfl: 0    ALLERGIES:    Allergies   Allergen Reactions    Sulfanilamide UNKNOWN     Had topically, got a \"sunburn\" reaction         Review of Systems:  Constitutional:  Denies fevers and chills   Cardiovascular:  denies chest pain or palpitations  Respiratory:  denies shortness of breath  Gastrointestinal:  denies heartburn, abdominal pain, diarrhea or constipation  Genitourinary:  denies dysuria, incontinence, abnormal vaginal discharge, vaginal itching  Musculoskeletal:  denies back pain.  Skin/Breast:  Denies any breast pain, lumps, or discharge.   Neurological:  denies headaches, extremity weakness  Psychiatric: denies depression or anxiety.      Vitals:    04/29/25 1538   BP: 119/74   Pulse: 105       PHYSICAL EXAM:   Constitutional: well developed, well nourished  Head/Face: normocephalic  Psychiatric: Appropriate mood and affect    Pelvic Exam: defer    BSUS: SLIUP +; limited US due to transabdominal approach to measure full CRL    Assessment/Plan:    Lillian was seen today for problem.    Diagnoses and all orders for this visit:    Spotting in pregnancy (HCC)      Assessment & Plan  Pregnancy with spotting    At 8 weeks and 4 days into her second pregnancy, she experiences light brown spotting, likely old blood. The fetal heartbeat is 168 bpm, indicating a viable pregnancy with no immediate miscarriage concerns. Her blood type is O positive, so RhoGAM is not needed. Advise abstaining from sexual intercourse for at least one week. Instruct to go to the ER if severe pain or heavy bleeding occurs. Keep next week's appointment for further evaluation and ultrasound. Review lab results at the next OB appointment.    Constipation    Mild constipation is reported, possibly  contributing to spotting due to straining.

## 2025-04-29 NOTE — TELEPHONE ENCOUNTER
Pt  8w4d calling to report that she just went to the bathroom and noticed brown/pink spotting with wiping. Pt denies spotting on underwear or in toilet. States she is \"a little crampy\". Pt denies recent IC. Pt stated she had some straining with BM yesterday. Pt informed that RN will speak with MD on call for quant orders. Pt tearful and asking if she can be seen in the office for FHT check.     Pt accepted appt with SCARLETT at 3:40pm. Pt appreciative.

## 2025-05-07 NOTE — PROGRESS NOTES
The following individual(s) verbally consented to be recorded using ambient AI listening technology and understand that they can each withdraw their consent to this listening technology at any point by asking the clinician to turn off or pause the recording:    Patient name: Lillian Bear Rhiannon Alvarado  Additional names:

## 2025-05-22 NOTE — TELEPHONE ENCOUNTER
Patient desires first trimester screen.  New Prenatal appointment was with Dr. Santos on 5/6.  Patient is 11w6d today.  Message to referral staff.

## 2025-05-23 NOTE — TELEPHONE ENCOUNTER
Lillian calling back. Provided information below. Advise can also consider quad screen around 16 weeks. She will discuss with  and let us know.

## 2025-05-23 NOTE — TELEPHONE ENCOUNTER
Patient would have to contact insurance to see what other location are cover under her insurance

## 2025-05-23 NOTE — TELEPHONE ENCOUNTER
Patient just received NIPT order on 5/22,   Patient called [facility] and told the test needs to be done by 6/5, but they had no availability.  Patient told to call Shail[facility] Gallegos and they told her they had no sooner appointment available.    Patient asking for help in getting this test done by 6/5.

## 2025-06-05 NOTE — PROGRESS NOTES
Feeling well.  Unable to do MFM genetics due to order not placed at new OB.  [id number] rx'ed today. Plan for level 2. FHT by US today. RTC 4 wks.

## 2025-07-22 NOTE — PROGRESS NOTES
Outpatient Maternal-Fetal Medicine Consultation    Dear Dr. William    Thank you for requesting ultrasound evaluation and maternal fetal medicine consultation on your patient Lillian Alvarado.  As you are aware she is a 35 year old female  with a herzog pregnancy and an Estimated Date of Delivery: 25.  A maternal-fetal medicine consultation was requested secondary to Obesity.  Her prenatal records and labs were reviewed.    ROS    HISTORY  OB History    Para Term  AB Living   2 1 1 0 0 1   SAB IAB Ectopic Multiple Live Births   0 0 0 0 1      # Outcome Date GA Lbr Brendon/2nd Weight Sex Type Anes PTL Lv   2 Current            1 Term 12/15/22 38w5d 08:50 / 05:23 7 lb 6.2 oz (3.35 kg) F NORMAL SPONT Local, EPI N JERRY      Complications: Oligohydramnios       Allergies:  Allergies   Allergen Reactions    Sulfanilamide UNKNOWN     Had topically, got a \"sunburn\" reaction      Current Meds:  Current Outpatient Medications   Medication Sig Dispense Refill    prenatal vitamin with DHA 27-0.8-228 MG Oral Cap Take 1 capsule by mouth daily.      famotidine 10 MG Oral Tab Take by mouth.      cyclobenzaprine 10 MG Oral Tab Take 1 tablet (10 mg total) by mouth 3 (three) times daily as needed for Muscle spasms.      Spacer/Aero-Holding Chambers Does not apply Device Use with albuterol inhaler 1 each 0    phenol 1.4 % Mouth/Throat Liquid Use as directed 1 mL in the mouth or throat every 2 (two) hours as needed. For 5 days 177 mL 0        HISTORY:  Past Medical History:    Allergy    allergies, on zyrtec    Asthma (HCC)    Inhaler PRN    Genital herpes      Past Surgical History:   Procedure Laterality Date    Tonsillectomy  2010      Family History   Problem Relation Age of Onset    Sleep apnea Father     Melanoma Mother     Tongue abnormality Daughter         Tongue and lip tie    Old age Maternal Grandmother         Passed at 96 from old age    Lung cancer (HCC) Maternal Grandfather     Atrial  fibrillation (HCC) Paternal Grandmother     Diabetes Paternal Grandfather     Allergies Other         family h/o    Heart Disease Other         coronary artery disease, family h/o on paternal side      Social History     Socioeconomic History    Marital status:    Occupational History    Occupation: [employer]     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   Tobacco Use    Smoking status: Never    Smokeless tobacco: Never   Vaping Use    Vaping status: Never Used   Substance and Sexual Activity    Alcohol use: Not Currently     Comment: occasional prior to pregnancy    Drug use: No    Sexual activity: Yes     Partners: Male     Birth control/protection: OCP     Comment: same partner   Other Topics Concern    Caffeine Concern Yes     Comment: coffee, soda, 1 cup daily    Exercise No     Social Drivers of Health     Food Insecurity: No Food Insecurity (5/4/2025)    NCSS - Food Insecurity     Worried About Running Out of Food in the Last Year: No     Ran Out of Food in the Last Year: No   Transportation Needs: No Transportation Needs (5/4/2025)    NCSS - Transportation     Lack of Transportation: No   Stress: No Stress Concern Present (5/4/2025)    Stress     Feeling of Stress : No   Housing Stability: Not At Risk (5/4/2025)    NCSS - Housing/Utilities     Has Housing: Yes     Worried About Losing Housing: No     Unable to Get Utilities: No          PHYSICAL EXAMINATION:  /67   Pulse 112   Wt 202 lb (91.6 kg)   LMP 02/28/2025   BMI 35.78 kg/m²   Physical Exam  Constitutional:       Appearance: Normal appearance.   Abdominal:      Palpations: Abdomen is soft.      Tenderness: There is no abdominal tenderness.   Neurological:      Mental Status: She is alert.   Psychiatric:         Mood and Affect: Mood normal.         Behavior: Behavior normal.           OBSTETRIC ULTRASOUND  The patient had a level II ultrasound today which revealed size consistent with dates and a normal  detailed anatomic survey.  Ultrasound findings:   Single IUP in breech presentation.    Placenta is anterior.   A 3 vessel cord is noted.  Insertion site: normal insertion  Cardiac activity is present at 146 bpm   g ( 0 lb 12 oz);   MVP is 3.1 cm  Cervix  measured  54.7 mm transabdominally   Uterus and adnexa appeared normal today on ultrasound    The fetal measurements are consistent with the established TABATHA. No ultrasound evidence of structural abnormalities are seen today. The nasal bone is present. No ultrasound evidence of markers for aneuploidy are seen. She understands that ultrasound exam cannot exclude genetic abnormalities and that genetic testing is recommended. The limitations of ultrasound were discussed.  See PACS/Imaging Tab For Complete Ultrasound Report  I interpreted the results and reviewed them with the patient.    DISCUSSION  During her visit we discussed and reviewed the following issues:  OBESITY:  Her BMI prior to pregnancy was 35  Obesity during pregnancy is associated with numerous maternal and  risks.  It is not clear whether obesity is a direct cause of adverse pregnancy outcome or whether the association between obesity and adverse pregnancy outcome is due to factors such as diabetes mellitus.   Data suggest that obese women should be encouraged to undertake a weight reduction program (diet, exercise, behavior modification, and possibly bariatric surgery in some cases) prior to attempting to conceive.            Subfertility in obese women is most commonly related to ovulatory dysfunction, and, in some obese women, the ovulatory dysfunction is related to polycystic ovary syndrome (PCOS). It is also important to note that even among ovulatory women, increasing obesity is associated with decreasing spontaneous pregnancy rates.  The increased risk of miscarriage in obese women may be because such women often have PCOS or isolated insulin resistance.                 Due to  its strong association with obesity in the general population, type 2 diabetes mellitus is one of the two most common medical complications of the obese . The increased risk of type 2 diabetes is primarily related to an exaggerated increase in insulin resistance in the obese state. It is reasonable to screen obese gravidas for undiagnosed pregestational diabetes in the first trimester.   Glucose intolerance associated with gestational diabetes generally resolves postpartum; however, obese women with a history of gestational diabetes have a two-fold increased prevalence of subsequent type 2 diabetes.           An association between obesity and hypertensive disorders during pregnancy has been consistently reported.  In particular, maternal weight and BMI are independent risk factors for preeclampsia.             Studies have found that the increased risk of  birth in obese gravidas is primarily associated with obesity-related medical and  complications, rather than an intrinsic predisposition to spontaneous  birth. Prevention of  birth in these patients, therefore, should be directed toward prevention or management of medical and obstetrical complications.               Both prepregnancy obesity and excessive maternal weight gain before or during pregnancy contribute to an increased probability of  delivery.  It has also been hypothesized that obesity may lead to dystocia due to increased soft tissue deposition in the maternal pelvis.    delivery in the obese  is associated with numerous perioperative concerns, including emergency delivery, prolonged incision to delivery interval, blood loss >1000 mL, longer operative times, wound infection, thromboembolism, and endometritis.            Maternal obesity appears to be associated with a small increase in the absolute rate of some congenital anomalies (primarily neural tube defect and cardiac), and the risk may  increase with increasing maternal weight.  Level II ultrasound is advised for women with obesity.  The risk of neural tube defects increased significantly with maternal weight.    The analysis found that overweight and obese pregnant women experienced significantly more stillbirths than normal weight women.  Third trimester  testing is advised.        IMPRESSION:  IUP at 20w4d  Obesity    RECOMMENDATIONS:  Continue care with Dr. William  Growth US & BPP at 32 weeks  Weekly NSTs at 36 weeks  11-20 lb weight gain for pregnancy        Thank you for allowing me to participate in the care of your patient.  Please do not hesitate to contact me if additional questions or concerns arise.      Mari Bentley M.D.    40 minutes spent in review of records, patient consultation, documentation and coordination of care.  The relevant clinical matter(s) are summarized above.     Note to patient and family  The 21st Century Cures Act makes medical notes available to patients in the interest of transparency.  However, please be advised that this is a medical document.  It is intended as [id number] communication.  It is written and medical language may contain abbreviations or verbiage that are technical and unfamiliar.  It may appear blunt or direct.  Medical documents are intended to carry relevant information, facts as evident, and the clinical opinion of the practitioner.

## (undated) NOTE — MR AVS SNAPSHOT
After Visit Summary   1/6/2021    Ajith Zambrano    MRN: [id number]           Visit Information     Date & Time  1/6/2021  5:00 PM Provider  Catie Tavera, [address] [place] [address], [address] [place] [address],[address], [facility]/[employer].  Phone  [facility] now offers Video Visits through [address] for adult and pediatric patients. Video Visits are available Monday - Friday for many common conditions such as allergies, colds, cough, fever, rash, sore throat, headache and pink eye.   The cost for a Video Vi [address]   Monday – Friday  4:00 pm – 10:00 pm   Saturday – Sunday  10:00 am – 4:00 pm  W[facility]IN [facility]  Emergency Medicine Providers  Conditions needing urgent attention, but are   non-life-threatening.     Also available by appointment Average cost  $120*

## (undated) NOTE — MR AVS SNAPSHOT
After Visit Summary   10/23/2019    Ajith Zambrano    MRN: [id number]           Visit Information     Date & Time  10/23/2019  3:40 PM Provider  Catie Tavera, [address] [place] [address], [address] [place] [address],[address], [facility]/[employer].  Phone Don’t eat while when you’re bored.      EAT THESE FOODS MORE OFTEN: EAT THESE FOODS LESS OFTEN:   Make half your plate fruits and vegetables Highly refined, white starches including white bread, rice and pasta   Eat plenty of protein, keep the fat content l non-emergency, consider your options before heading to an ER.          SAME DAY  APPOINTMENTS  Available at primary care offices      [facility]     Treatment for mild  illness or inj

## (undated) NOTE — LETTER
4/5/2021              Lashawn tS        [place]         Dear Pinky Ellison,    This letter is to inform you that our office has made several attempts to reach you by phone and messages via codesy without success. We were attempting to contact you by phone regarding lab results and schedule a colposcopy. Please contact our office at the number listed below as soon as you receive this letter to discuss this issue and to make the necessary changes in our system to your contact information. Thank you for your cooperation.         Sincerely,    DARIO Newell  [address] [place] [address], [address]  [facility]  [phone]        Document electronically signed by: DARIO Newell

## (undated) NOTE — ED AVS SNAPSHOT
[facility] Emergency Department    [place] [address]     [place] [address]    Phone:  [phone]    Fax:  [address] [place] [address]   MRN: [id number]    Department:  [facility] Emergency Department   Date of Visit:  4/5/2 coverage and benefits available for follow-up care and referrals. If you have difficulty scheduling your follow-up appointment as directed, please call our  at [phone].      Si tiene problemas para programar axel charu de seguimiento s different from what your Primary Care doctor has instructed you - please continue to take your medications as instructed by your Primary Care doctor until you can check with your doctor. Please bring the medication list to your next doctor's appointment. can help with your Affordable Care Act coverage, as well as all types of Medicaid plans. To get signed up and covered, please call [phone] and ask to get set up for an insurance coverage that is in-network with [facility]

## (undated) NOTE — ED AVS SNAPSHOT
Suze Prado   MRN: [id number]    Department:  [facility] Emergency Department   Date of Visit:  3/4/2018           Disclosure     Insurance plans vary and the physician(s) referred by the ER may not be covered by your plan.  Please contact yo CARE PHYSICIAN AT ONCE OR RETURN IMMEDIATELY TO THE EMERGENCY DEPARTMENT. If you have been prescribed any medication(s), please fill your prescription right away and begin taking the medication(s) as directed.   If you believe that any of the medications

## (undated) NOTE — ED AVS SNAPSHOT
[facility] Emergency Department    [facility] [address]     [place] [address] [address]    Phone:  [phone]    Fax:  [address] [place] [address]   MRN: [id number]    Department:  [facility] Emergency Department   Date of Visit:  4/5/2 and Class Registration line at [phone] or find a doctor online by visiting [email or web address].    IF THERE IS ANY CHANGE OR WORSENING OF YOUR CONDITION, CALL YOUR PRIMARY CARE PHYSICIAN AT ONCE OR RETURN IMMEDIATELY TO [address] [place] [address].     If

## (undated) NOTE — LETTER
AUTHORIZATION FOR SURGICAL OPERATION OR OTHER PROCEDURE    1.  I hereby authorize Dr. Oliverio Melendrez, and [facility] staff assigned to my case to perform the following operation and/or procedure at the [facility]:      Colposcopy  _____________________ Time:  ________ A. M.  P.M.        Patient Name:  ______________________________________________________  (please print)      Patient signature:  ___________________________________________________             Relationship to Patient:

## (undated) NOTE — LETTER
VACCINE ADMINISTRATION RECORD  PARENT / GUARDIAN APPROVAL  Date: 11/15/2022  Vaccine administered to: Jacinto Rios     : 1990    MRN: [id number]    A copy of the appropriate Centers for Disease Control and Prevention Vaccine Information statement has been provided. I have read or have had explained the information about the diseases and the vaccines listed below. There was an opportunity to ask questions and any questions were answered satisfactorily. I believe that I understand the benefits and risks of the vaccine cited and ask that the vaccine(s) listed below be given to me or to the person named above (for whom I am authorized to make this request). VACCINES ADMINISTERED:  TDAP    I have read and hereby agree to be bound by the terms of this agreement as stated above. My signature is valid until revoked by me in writing. This document is signed by SELF, relationship: SELF on 11/15/2022.:                                                                                                                                         Parent / Guardian Signature                                                Date    Abel Guaman served as a witness to authentication that the identity of the person signing electronically is in fact the person represented as signing. This document was generated by Naya Macedo MA on 11/15/2022.

## (undated) NOTE — MR AVS SNAPSHOT
[place]  [address]  [phone]               Thank you for choosing us for your health care visit with Karina Hoffman MD.  We are glad to serve you and happy to provide you with this summar What changed:  Another medication with the same name was removed. Continue taking this medication, and follow the directions you see here.    Commonly known as:  VALTREX                Where to Get Your Medications      These medications were sent to [facility] Get your heart pumping – brisk walking, biking, swimming Even 10 minute increments are effective and add up over the week   2 ½ hours per week – spread out over time Use a bailey to keep you motivated   Don’t forget strength training with weights and resist